# Patient Record
Sex: FEMALE | Race: WHITE | NOT HISPANIC OR LATINO | Employment: FULL TIME | URBAN - NONMETROPOLITAN AREA
[De-identification: names, ages, dates, MRNs, and addresses within clinical notes are randomized per-mention and may not be internally consistent; named-entity substitution may affect disease eponyms.]

---

## 2023-03-20 PROBLEM — M79.7 FIBROMYALGIA: Status: ACTIVE | Noted: 2023-03-20

## 2023-03-20 PROBLEM — F32.1 DEPRESSION, MAJOR, SINGLE EPISODE, MODERATE (MULTI): Status: ACTIVE | Noted: 2023-03-20

## 2023-03-20 PROBLEM — D22.9 ATYPICAL MOLE: Status: ACTIVE | Noted: 2023-03-20

## 2023-03-20 PROBLEM — F98.21 RUMINATION DISORDER: Status: ACTIVE | Noted: 2023-03-20

## 2023-03-20 PROBLEM — G47.33 MILD OBSTRUCTIVE SLEEP APNEA: Status: ACTIVE | Noted: 2023-03-20

## 2023-03-20 PROBLEM — K58.9 IBS (IRRITABLE BOWEL SYNDROME): Status: ACTIVE | Noted: 2023-03-20

## 2023-03-20 PROBLEM — J30.2 SEASONAL ALLERGIES: Status: ACTIVE | Noted: 2023-03-20

## 2023-03-20 PROBLEM — R53.83 FATIGUE: Status: ACTIVE | Noted: 2023-03-20

## 2023-03-20 PROBLEM — R07.89 ATYPICAL CHEST PAIN: Status: ACTIVE | Noted: 2023-03-20

## 2023-03-20 PROBLEM — N94.3 PMS (PREMENSTRUAL SYNDROME): Status: ACTIVE | Noted: 2023-03-20

## 2023-03-20 PROBLEM — F41.9 ANXIETY: Status: ACTIVE | Noted: 2023-03-20

## 2023-03-20 RX ORDER — DROSPIRENONE AND ETHINYL ESTRADIOL 0.02-3(28)
1 KIT ORAL DAILY
COMMUNITY
End: 2023-10-25 | Stop reason: SDUPTHER

## 2023-03-20 RX ORDER — METHOCARBAMOL 500 MG/1
500 TABLET, FILM COATED ORAL DAILY
COMMUNITY
End: 2023-03-27 | Stop reason: ALTCHOICE

## 2023-03-20 RX ORDER — HYDROXYZINE HYDROCHLORIDE 25 MG/1
25 TABLET, FILM COATED ORAL EVERY 6 HOURS PRN
COMMUNITY
Start: 2022-09-26

## 2023-03-20 RX ORDER — MONTELUKAST SODIUM 10 MG/1
1 TABLET ORAL NIGHTLY
COMMUNITY
Start: 2022-05-24 | End: 2024-03-14 | Stop reason: SDUPTHER

## 2023-03-20 RX ORDER — PREGABALIN 200 MG/1
200 CAPSULE ORAL 2 TIMES DAILY
COMMUNITY
End: 2023-09-13 | Stop reason: SDUPTHER

## 2023-03-20 RX ORDER — PROMETHAZINE HYDROCHLORIDE 25 MG/1
25 TABLET ORAL DAILY
COMMUNITY
End: 2023-09-13 | Stop reason: ALTCHOICE

## 2023-03-20 RX ORDER — DULOXETIN HYDROCHLORIDE 60 MG/1
60 CAPSULE, DELAYED RELEASE ORAL DAILY
COMMUNITY
End: 2023-04-10 | Stop reason: SDUPTHER

## 2023-03-21 PROBLEM — R60.0 LEG EDEMA: Status: ACTIVE | Noted: 2023-03-21

## 2023-03-21 PROBLEM — M13.0 POLYARTHROPATHY: Status: ACTIVE | Noted: 2023-03-21

## 2023-03-21 PROBLEM — R60.9 FLUID RETENTION: Status: ACTIVE | Noted: 2023-03-21

## 2023-03-21 PROBLEM — R00.2 PALPITATION: Status: ACTIVE | Noted: 2023-03-21

## 2023-03-21 PROBLEM — Q21.12 PATENT FORAMEN OVALE (HHS-HCC): Status: ACTIVE | Noted: 2023-03-21

## 2023-03-21 RX ORDER — INHALER, ASSIST DEVICES
SPACER (EA) MISCELLANEOUS
COMMUNITY
Start: 2022-10-06 | End: 2023-03-27 | Stop reason: ALTCHOICE

## 2023-03-27 ENCOUNTER — APPOINTMENT (OUTPATIENT)
Dept: PRIMARY CARE | Facility: CLINIC | Age: 23
End: 2023-03-27
Payer: COMMERCIAL

## 2023-03-27 ENCOUNTER — OFFICE VISIT (OUTPATIENT)
Dept: PRIMARY CARE | Facility: CLINIC | Age: 23
End: 2023-03-27
Payer: COMMERCIAL

## 2023-03-27 VITALS
HEIGHT: 63 IN | OXYGEN SATURATION: 98 % | WEIGHT: 229 LBS | SYSTOLIC BLOOD PRESSURE: 130 MMHG | DIASTOLIC BLOOD PRESSURE: 78 MMHG | BODY MASS INDEX: 40.57 KG/M2 | HEART RATE: 114 BPM

## 2023-03-27 DIAGNOSIS — F41.9 ANXIETY: ICD-10-CM

## 2023-03-27 DIAGNOSIS — E66.01 CLASS 3 SEVERE OBESITY DUE TO EXCESS CALORIES WITHOUT SERIOUS COMORBIDITY WITH BODY MASS INDEX (BMI) OF 40.0 TO 44.9 IN ADULT (MULTI): ICD-10-CM

## 2023-03-27 DIAGNOSIS — R00.2 PALPITATION: ICD-10-CM

## 2023-03-27 DIAGNOSIS — K58.2 IRRITABLE BOWEL SYNDROME WITH BOTH CONSTIPATION AND DIARRHEA: ICD-10-CM

## 2023-03-27 DIAGNOSIS — F32.1 DEPRESSION, MAJOR, SINGLE EPISODE, MODERATE (MULTI): ICD-10-CM

## 2023-03-27 DIAGNOSIS — M13.0 POLYARTHROPATHY: Primary | ICD-10-CM

## 2023-03-27 PROBLEM — R60.0 LEG EDEMA: Status: RESOLVED | Noted: 2023-03-21 | Resolved: 2023-03-27

## 2023-03-27 PROBLEM — E66.813 CLASS 3 SEVERE OBESITY DUE TO EXCESS CALORIES WITHOUT SERIOUS COMORBIDITY WITH BODY MASS INDEX (BMI) OF 40.0 TO 44.9 IN ADULT: Status: ACTIVE | Noted: 2023-03-27

## 2023-03-27 PROCEDURE — 3008F BODY MASS INDEX DOCD: CPT | Performed by: INTERNAL MEDICINE

## 2023-03-27 PROCEDURE — 1036F TOBACCO NON-USER: CPT | Performed by: INTERNAL MEDICINE

## 2023-03-27 PROCEDURE — 99214 OFFICE O/P EST MOD 30 MIN: CPT | Performed by: INTERNAL MEDICINE

## 2023-03-27 ASSESSMENT — ENCOUNTER SYMPTOMS
SHORTNESS OF BREATH: 1
WHEEZING: 0
FATIGUE: 1
BACK PAIN: 0
VOMITING: 0
ARTHRALGIAS: 1
CONSTIPATION: 1
NAUSEA: 0
PALPITATIONS: 1
BLOOD IN STOOL: 0
DIARRHEA: 1
COUGH: 0
CHEST TIGHTNESS: 0
ABDOMINAL PAIN: 0

## 2023-03-27 NOTE — ASSESSMENT & PLAN NOTE
-She is going for stress test in the near future and is following closely with cardiology  -There is discussion about the possibility of post-COVID this autonomic regulation

## 2023-03-27 NOTE — PROGRESS NOTES
Subjective   Patient ID: Mable Carrillo is a 22 y.o. female who presents for No chief complaint on file..  HPI  She is here today with multiple concerns.  She is accompanied by her significant other.She is  accompanied by her significant other.  She explains that recently she has been working with the cardiologist and has a stress test planned for the near future.  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  She states she has been encountering lots of symptoms including sometimes a hyper reactivity to things in her environment.  She states she has a friend who suggested she might be suffering from mast cell D regulation.  She complains of swollen joints and so forth.  We did review her laboratory test results from several months ago and she did have a borderline elevated sed rate and elevated CBC platelet count.  I have decided to repeat those tests and we will contact her with the results.  She will let us know how things go after her completion of her cardiac evaluation.  We also discussed other issues such as her anxiety and depression.  She is working closely with psychiatry and recently had medication changes but they were not very helpful.  She will continue to follow-up with them.  She also has her IBS symptoms and I have specifically recommended she start taking the probiotic align for the next month and let us know how things are going.  We did conduct a review of systems.  Review of Systems   Constitutional:  Positive for fatigue.   Respiratory:  Positive for shortness of breath. Negative for cough, chest tightness and wheezing.    Cardiovascular:  Positive for chest pain and palpitations. Negative for leg swelling.   Gastrointestinal:  Positive for constipation and diarrhea. Negative for abdominal pain, blood in stool, nausea and vomiting.   Musculoskeletal:  Positive for arthralgias. Negative for back pain.     Objective   Physical Exam  Vitals and nursing note  reviewed.   Constitutional:       General: She is not in acute distress.     Appearance: Normal appearance.   HENT:      Head: Normocephalic and atraumatic.   Eyes:      Conjunctiva/sclera: Conjunctivae normal.   Cardiovascular:      Rate and Rhythm: Normal rate and regular rhythm.      Heart sounds: Normal heart sounds.   Pulmonary:      Effort: No respiratory distress.      Breath sounds: No wheezing.   Abdominal:      Palpations: Abdomen is soft.      Tenderness: There is no abdominal tenderness. There is no guarding.   Musculoskeletal:         General: No swelling. Normal range of motion.   Skin:     General: Skin is warm and dry.   Neurological:      General: No focal deficit present.      Mental Status: She is alert and oriented to person, place, and time.   Psychiatric:         Behavior: Behavior normal.       Assessment/Plan   Problem List Items Addressed This Visit          Circulatory    Palpitation     -She is going for stress test in the near future and is following closely with cardiology  -There is discussion about the possibility of post-COVID this autonomic regulation            Digestive    IBS (irritable bowel syndrome)     -We discussed trying over-the-counter probiotic called Align            Musculoskeletal    Polyarthropathy - Primary     -She is having complaints of pain and swelling in her joints and hands.  -She has had a prior borderline elevation in her sed rate and also platelet count  -I think it is reasonable to repeat some biomarkers of inflammation and we will be contacting her with results         Relevant Orders    Sedimentation Rate    C-reactive protein    CBC and Auto Differential    ANGELLA    Rheumatoid factor       Endocrine/Metabolic    Class 3 severe obesity due to excess calories without serious comorbidity with body mass index (BMI) of 40.0 to 44.9 in adult (CMS/[facility])     -work on diet and exercise            Other    Anxiety     -She will continue with her counselor and  psychiatrist on medication adjustments         Depression, major, single episode, moderate (CMS/HCC)     -continue with psychiatrist               Angela Staton, DO

## 2023-03-27 NOTE — ASSESSMENT & PLAN NOTE
-She is having complaints of pain and swelling in her joints and hands.  -She has had a prior borderline elevation in her sed rate and also platelet count  -I think it is reasonable to repeat some biomarkers of inflammation and we will be contacting her with results

## 2023-03-27 NOTE — PATIENT INSTRUCTIONS
-As we discussed I am sending you for laboratory tests that I called biomarkers of inflammation.  I believe it is worthwhile to check them 1 more time and if they are not significantly abnormal we will not pursue further blood work in that department.  -I am glad that you are following closely with your psychiatrist and please continue working with them on medication adjustments  -We will need to see what cardiology finds in the way of their work-up and we might need to refer you on if necessary

## 2023-03-29 ENCOUNTER — LAB (OUTPATIENT)
Dept: LAB | Facility: LAB | Age: 23
End: 2023-03-29
Payer: COMMERCIAL

## 2023-03-29 DIAGNOSIS — M06.4 INFLAMMATORY POLYARTHROPATHY (MULTI): Primary | ICD-10-CM

## 2023-03-29 DIAGNOSIS — M13.0 POLYARTHROPATHY: ICD-10-CM

## 2023-03-29 LAB
BASOPHILS (10*3/UL) IN BLOOD BY AUTOMATED COUNT: 0.1 X10E9/L (ref 0–0.1)
BASOPHILS/100 LEUKOCYTES IN BLOOD BY AUTOMATED COUNT: 1.1 % (ref 0–2)
C REACTIVE PROTEIN (MG/L) IN SER/PLAS: 0.98 MG/DL
EOSINOPHILS (10*3/UL) IN BLOOD BY AUTOMATED COUNT: 0.3 X10E9/L (ref 0–0.7)
EOSINOPHILS/100 LEUKOCYTES IN BLOOD BY AUTOMATED COUNT: 3.3 % (ref 0–6)
ERYTHROCYTE DISTRIBUTION WIDTH (RATIO) BY AUTOMATED COUNT: 14 % (ref 11.5–14.5)
ERYTHROCYTE MEAN CORPUSCULAR HEMOGLOBIN CONCENTRATION (G/DL) BY AUTOMATED: 31.4 G/DL (ref 32–36)
ERYTHROCYTE MEAN CORPUSCULAR VOLUME (FL) BY AUTOMATED COUNT: 88 FL (ref 80–100)
ERYTHROCYTES (10*6/UL) IN BLOOD BY AUTOMATED COUNT: 5.35 X10E12/L (ref 4–5.2)
HEMATOCRIT (%) IN BLOOD BY AUTOMATED COUNT: 47.1 % (ref 36–46)
HEMOGLOBIN (G/DL) IN BLOOD: 14.8 G/DL (ref 12–16)
IMMATURE GRANULOCYTES/100 LEUKOCYTES IN BLOOD BY AUTOMATED COUNT: 0.2 % (ref 0–0.9)
LEUKOCYTES (10*3/UL) IN BLOOD BY AUTOMATED COUNT: 9.1 X10E9/L (ref 4.4–11.3)
LYMPHOCYTES (10*3/UL) IN BLOOD BY AUTOMATED COUNT: 3.35 X10E9/L (ref 1.2–4.8)
LYMPHOCYTES/100 LEUKOCYTES IN BLOOD BY AUTOMATED COUNT: 36.7 % (ref 13–44)
MONOCYTES (10*3/UL) IN BLOOD BY AUTOMATED COUNT: 0.64 X10E9/L (ref 0.1–1)
MONOCYTES/100 LEUKOCYTES IN BLOOD BY AUTOMATED COUNT: 7 % (ref 2–10)
NEUTROPHILS (10*3/UL) IN BLOOD BY AUTOMATED COUNT: 4.72 X10E9/L (ref 1.2–7.7)
NEUTROPHILS/100 LEUKOCYTES IN BLOOD BY AUTOMATED COUNT: 51.7 % (ref 40–80)
PLATELETS (10*3/UL) IN BLOOD AUTOMATED COUNT: 516 X10E9/L (ref 150–450)
RHEUMATOID FACTOR (IU/ML) IN SERUM OR PLASMA: <10 IU/ML (ref 0–15)
SEDIMENTATION RATE, ERYTHROCYTE: 78 MM/H (ref 0–20)

## 2023-03-29 PROCEDURE — 85652 RBC SED RATE AUTOMATED: CPT

## 2023-03-29 PROCEDURE — 86431 RHEUMATOID FACTOR QUANT: CPT

## 2023-03-29 PROCEDURE — 36415 COLL VENOUS BLD VENIPUNCTURE: CPT

## 2023-03-29 PROCEDURE — 86140 C-REACTIVE PROTEIN: CPT

## 2023-03-29 PROCEDURE — 86038 ANTINUCLEAR ANTIBODIES: CPT

## 2023-03-29 PROCEDURE — 85025 COMPLETE CBC W/AUTO DIFF WBC: CPT

## 2023-03-30 ENCOUNTER — LAB (OUTPATIENT)
Dept: LAB | Facility: LAB | Age: 23
End: 2023-03-30
Payer: COMMERCIAL

## 2023-03-30 DIAGNOSIS — M13.0 POLYARTHROPATHY: Primary | ICD-10-CM

## 2023-03-30 DIAGNOSIS — M06.4 INFLAMMATORY POLYARTHROPATHY (MULTI): ICD-10-CM

## 2023-03-30 LAB
ALANINE AMINOTRANSFERASE (SGPT) (U/L) IN SER/PLAS: 32 U/L (ref 7–45)
ALBUMIN (G/DL) IN SER/PLAS: 4.5 G/DL (ref 3.4–5)
ALKALINE PHOSPHATASE (U/L) IN SER/PLAS: 75 U/L (ref 33–110)
ANTI-NUCLEAR ANTIBODY (ANA): NEGATIVE
ASPARTATE AMINOTRANSFERASE (SGOT) (U/L) IN SER/PLAS: 27 U/L (ref 9–39)
BILIRUBIN DIRECT (MG/DL) IN SER/PLAS: 0.1 MG/DL (ref 0–0.3)
BILIRUBIN TOTAL (MG/DL) IN SER/PLAS: 0.2 MG/DL (ref 0–1.2)
PROTEIN TOTAL: 8.2 G/DL (ref 6.4–8.2)

## 2023-03-30 PROCEDURE — 36415 COLL VENOUS BLD VENIPUNCTURE: CPT

## 2023-03-30 PROCEDURE — 80076 HEPATIC FUNCTION PANEL: CPT

## 2023-04-10 DIAGNOSIS — F32.1 DEPRESSION, MAJOR, SINGLE EPISODE, MODERATE (MULTI): Primary | ICD-10-CM

## 2023-04-10 RX ORDER — DULOXETIN HYDROCHLORIDE 60 MG/1
60 CAPSULE, DELAYED RELEASE ORAL DAILY
Qty: 30 CAPSULE | Refills: 2 | Status: SHIPPED | OUTPATIENT
Start: 2023-04-10 | End: 2023-09-13 | Stop reason: ALTCHOICE

## 2023-04-24 ENCOUNTER — HOSPITAL ENCOUNTER (OUTPATIENT)
Dept: DATA CONVERSION | Facility: HOSPITAL | Age: 23
End: 2023-04-24
Attending: NURSE PRACTITIONER

## 2023-05-25 ENCOUNTER — OFFICE VISIT (OUTPATIENT)
Dept: PRIMARY CARE | Facility: CLINIC | Age: 23
End: 2023-05-25
Payer: COMMERCIAL

## 2023-05-25 VITALS
BODY MASS INDEX: 40.04 KG/M2 | DIASTOLIC BLOOD PRESSURE: 96 MMHG | HEIGHT: 63 IN | WEIGHT: 226 LBS | HEART RATE: 120 BPM | OXYGEN SATURATION: 98 % | SYSTOLIC BLOOD PRESSURE: 122 MMHG

## 2023-05-25 DIAGNOSIS — U09.9 POST COVID-19 CONDITION, UNSPECIFIED: Primary | ICD-10-CM

## 2023-05-25 DIAGNOSIS — M79.7 FIBROMYALGIA: ICD-10-CM

## 2023-05-25 DIAGNOSIS — R03.0 ELEVATED BLOOD PRESSURE READING: ICD-10-CM

## 2023-05-25 DIAGNOSIS — R60.0 LOCALIZED EDEMA: ICD-10-CM

## 2023-05-25 DIAGNOSIS — F32.1 CURRENT MODERATE EPISODE OF MAJOR DEPRESSIVE DISORDER WITHOUT PRIOR EPISODE (MULTI): ICD-10-CM

## 2023-05-25 DIAGNOSIS — E87.6 HYPOKALEMIA: ICD-10-CM

## 2023-05-25 PROCEDURE — 1036F TOBACCO NON-USER: CPT | Performed by: INTERNAL MEDICINE

## 2023-05-25 PROCEDURE — 99214 OFFICE O/P EST MOD 30 MIN: CPT | Performed by: INTERNAL MEDICINE

## 2023-05-25 RX ORDER — PREGABALIN 200 MG/1
200 CAPSULE ORAL 2 TIMES DAILY
COMMUNITY
Start: 2023-03-31 | End: 2023-05-25 | Stop reason: SDUPTHER

## 2023-05-25 RX ORDER — PREGABALIN 200 MG/1
200 CAPSULE ORAL 2 TIMES DAILY
Qty: 60 CAPSULE | Refills: 2 | Status: SHIPPED | OUTPATIENT
Start: 2023-05-25 | End: 2023-09-13 | Stop reason: ALTCHOICE

## 2023-05-25 RX ORDER — POTASSIUM CHLORIDE 1500 MG/1
20 TABLET, EXTENDED RELEASE ORAL DAILY
Qty: 30 TABLET | Refills: 2 | Status: SHIPPED | OUTPATIENT
Start: 2023-05-25 | End: 2023-06-02

## 2023-05-25 RX ORDER — DULOXETIN HYDROCHLORIDE 20 MG/1
20 CAPSULE, DELAYED RELEASE ORAL DAILY
COMMUNITY
Start: 2023-04-25 | End: 2024-04-09 | Stop reason: WASHOUT

## 2023-05-25 RX ORDER — HYDROCHLOROTHIAZIDE 25 MG/1
25 TABLET ORAL DAILY
Qty: 30 TABLET | Refills: 0 | Status: SHIPPED | OUTPATIENT
Start: 2023-05-25 | End: 2023-07-25 | Stop reason: SDUPTHER

## 2023-05-25 RX ORDER — DULOXETIN HYDROCHLORIDE 60 MG/1
60 CAPSULE, DELAYED RELEASE ORAL DAILY
COMMUNITY
Start: 2023-04-25

## 2023-05-25 ASSESSMENT — ENCOUNTER SYMPTOMS
FATIGUE: 1
FEVER: 0
WHEEZING: 0
ABDOMINAL PAIN: 0
PALPITATIONS: 1
ARTHRALGIAS: 1
VOMITING: 0
CHEST TIGHTNESS: 0
DIARRHEA: 0
DIAPHORESIS: 1
NAUSEA: 0
SHORTNESS OF BREATH: 0
BLOOD IN STOOL: 0
COUGH: 0

## 2023-05-25 NOTE — ASSESSMENT & PLAN NOTE
-For her blood pressure we are continuing with hydrochlorothiazide 25 mg daily and when she returns we will reassess her readings

## 2023-05-25 NOTE — ASSESSMENT & PLAN NOTE
-We have provided a refill on Lyrica 200 mg twice daily  -She has signed a controlled substance contract  -I did check [place] and everything is appropriate

## 2023-05-25 NOTE — PATIENT INSTRUCTIONS
As we discussed I sent a prescription to your pharmacy for Lyrica and please follow the rules with the controlled substance agreement contract  I also sent a prescription to your pharmacy for 2 new medications including hydrochlorothiazide 25 mg daily and potassium chloride 20 mill equivalents daily.  Please take both of these medications each morning and we are hoping that this will help with your swelling as well as your blood pressure.  Please do not hesitate to call if you are having problems with this medication and otherwise I do need to see you back in 4 weeks for reevaluation with lab work

## 2023-05-25 NOTE — ASSESSMENT & PLAN NOTE
-Her potassium level was borderline low in the emergency department  -It is presumed to be secondary to hydrochlorothiazide  -I am adding potassium chloride 20 mill equivalents daily and in approximately 4 weeks she will go for fasting lab work just prior to her follow-up visit with me

## 2023-05-25 NOTE — ASSESSMENT & PLAN NOTE
-She saw a cardiologist at the [facility] for second opinion regarding her abnormal echocardiogram.  She states she had retesting and she does not have a significant PFO  -She states she has been diagnosed with post COVID this autonomic and it was felt that her condition would improve over the next 6 months.  She states there was discussion about starting a beta-blocker and we did discuss it today but decided not to enact this medication since her adding hydrochlorothiazide

## 2023-05-25 NOTE — PROGRESS NOTES
Subjective   Patient ID: Mable Carrillo is a 22 y.o. female who presents for No chief complaint on file..  HPI  She is here today for reevaluation and is accompanied by her boyfriend.  She explains that recently she went to urgent care because of an intensely sore throat and because her blood pressure and pulse rate was elevated she was instructed to go to the emergency department.  She states there she had some lab work and they discovered that her potassium level was borderline low.  She was diagnosed with acute pharyngitis and prescribed a Z-Quinn.  She states that her respiratory symptoms have improved but she did request that I look in her ears nose and throat to make sure all was well.  Her exam looks good today.  She also explains that she went to an urgent care because of the leg swelling and was given a prescription for hydrochlorothiazide 25 mg.  She states she took it for a couple of weeks and it seemed to help a lot with her swelling and her sense of wellbeing.  We talked about how hydrochlorothiazide can cause potassium depletion and could also contribute to dehydration and racing heart.  We also discussed the importance of trying to stay with 1 provider because sometimes her can be issues with overlapping that could be dangerous.  I have agreed to refill her hydrochlorothiazide as I explained this can help with blood pressure.  We do want her to also issue a potassium prescription to take concomitantly with this medicine.  We will also have her get a lab test for potassium in approximately 4 weeks and we will see her back in follow-up just to make sure all is well with her blood pressure and lab work, etc.  We are also providing a refill on her Lyrica for her fibromyalgia and she has signed a controlled substance agreement in the past.  I have checked [place].  Review of Systems   Constitutional:  Positive for diaphoresis and fatigue. Negative for fever.   Respiratory:  Negative for cough, chest  tightness, shortness of breath and wheezing.    Cardiovascular:  Positive for palpitations and leg swelling. Negative for chest pain.   Gastrointestinal:  Negative for abdominal pain, blood in stool, diarrhea, nausea and vomiting.   Musculoskeletal:  Positive for arthralgias.     Objective   Physical Exam  Vitals and nursing note reviewed.   Constitutional:       General: She is not in acute distress.     Appearance: Normal appearance.   HENT:      Head: Normocephalic and atraumatic.   Eyes:      Conjunctiva/sclera: Conjunctivae normal.   Cardiovascular:      Rate and Rhythm: Normal rate and regular rhythm.      Heart sounds: Normal heart sounds.   Pulmonary:      Effort: No respiratory distress.      Breath sounds: No wheezing.   Abdominal:      Palpations: Abdomen is soft.      Tenderness: There is no abdominal tenderness. There is no guarding.   Musculoskeletal:         General: No swelling. Normal range of motion.   Skin:     General: Skin is warm and dry.   Neurological:      General: No focal deficit present.      Mental Status: She is alert and oriented to person, place, and time.   Psychiatric:         Behavior: Behavior normal.       Assessment/Plan   Problem List Items Addressed This Visit          Circulatory    Elevated blood pressure reading     -For her blood pressure we are continuing with hydrochlorothiazide 25 mg daily and when she returns we will reassess her readings            Musculoskeletal    Fibromyalgia     -We have provided a refill on Lyrica 200 mg twice daily  -She has signed a controlled substance contract  -I did check [place] and everything is appropriate         Relevant Medications    pregabalin (Lyrica) 200 mg capsule    Other Relevant Orders    Follow Up In Primary Care       Other    Post covid-19 condition, unspecified - Primary     -She saw a cardiologist at the [facility] for second opinion regarding her abnormal echocardiogram.  She states she had retesting and she does  not have a significant PFO  -She states she has been diagnosed with post COVID this autonomic and it was felt that her condition would improve over the next 6 months.  She states there was discussion about starting a beta-blocker and we did discuss it today but decided not to enact this medication since her adding hydrochlorothiazide         Hypokalemia     -Her potassium level was borderline low in the emergency department  -It is presumed to be secondary to hydrochlorothiazide  -I am adding potassium chloride 20 mill equivalents daily and in approximately 4 weeks she will go for fasting lab work just prior to her follow-up visit with me         Relevant Medications    potassium chloride CR (K-Tab) 20 mEq ER tablet    Other Relevant Orders    Basic Metabolic Panel    Follow Up In Primary Care    Localized edema     She will stay on hydrochlorothiazide 25 mg daily         Relevant Medications    hydroCHLOROthiazide (HYDRODiuril) 25 mg tablet    Other Relevant Orders    Basic Metabolic Panel    Follow Up In Primary Care    Current moderate episode of major depressive disorder without prior episode (CMS/HCC)     -She is on Cymbalta a combination of 60+20 mg daily         Relevant Orders    Follow Up In Primary Care          Angela Staton DO

## 2023-06-02 ENCOUNTER — TELEPHONE (OUTPATIENT)
Dept: PRIMARY CARE | Facility: CLINIC | Age: 23
End: 2023-06-02
Payer: COMMERCIAL

## 2023-06-02 DIAGNOSIS — E87.6 HYPOKALEMIA: Primary | ICD-10-CM

## 2023-06-02 RX ORDER — POTASSIUM CHLORIDE 750 MG/1
10 TABLET, FILM COATED, EXTENDED RELEASE ORAL 2 TIMES DAILY
Qty: 60 TABLET | Refills: 5 | Status: SHIPPED | OUTPATIENT
Start: 2023-06-02 | End: 2023-09-13 | Stop reason: SDUPTHER

## 2023-06-02 NOTE — TELEPHONE ENCOUNTER
Pt called stating that the potassium 20meq is on back order they are recommending you send in 10meq BID [facility]

## 2023-06-26 ENCOUNTER — LAB (OUTPATIENT)
Dept: LAB | Facility: LAB | Age: 23
End: 2023-06-26
Payer: COMMERCIAL

## 2023-06-26 LAB
ANION GAP IN SER/PLAS: 13 MMOL/L (ref 10–20)
CALCIUM (MG/DL) IN SER/PLAS: 9.3 MG/DL (ref 8.6–10.3)
CARBON DIOXIDE, TOTAL (MMOL/L) IN SER/PLAS: 25 MMOL/L (ref 21–32)
CHLORIDE (MMOL/L) IN SER/PLAS: 104 MMOL/L (ref 98–107)
CREATININE (MG/DL) IN SER/PLAS: 0.83 MG/DL (ref 0.5–1.05)
GFR FEMALE: >90 ML/MIN/1.73M2
GLUCOSE (MG/DL) IN SER/PLAS: 150 MG/DL (ref 74–99)
POTASSIUM (MMOL/L) IN SER/PLAS: 4.1 MMOL/L (ref 3.5–5.3)
SODIUM (MMOL/L) IN SER/PLAS: 138 MMOL/L (ref 136–145)
UREA NITROGEN (MG/DL) IN SER/PLAS: 14 MG/DL (ref 6–23)

## 2023-06-26 PROCEDURE — 80048 BASIC METABOLIC PNL TOTAL CA: CPT

## 2023-06-26 PROCEDURE — 36415 COLL VENOUS BLD VENIPUNCTURE: CPT

## 2023-07-07 ENCOUNTER — APPOINTMENT (OUTPATIENT)
Dept: PRIMARY CARE | Facility: CLINIC | Age: 23
End: 2023-07-07
Payer: COMMERCIAL

## 2023-07-19 ENCOUNTER — OFFICE VISIT (OUTPATIENT)
Dept: PRIMARY CARE | Facility: CLINIC | Age: 23
End: 2023-07-19
Payer: COMMERCIAL

## 2023-07-19 VITALS
HEART RATE: 109 BPM | BODY MASS INDEX: 40.57 KG/M2 | DIASTOLIC BLOOD PRESSURE: 80 MMHG | SYSTOLIC BLOOD PRESSURE: 128 MMHG | HEIGHT: 63 IN | OXYGEN SATURATION: 97 % | WEIGHT: 229 LBS

## 2023-07-19 DIAGNOSIS — Q21.12 PATENT FORAMEN OVALE (HHS-HCC): ICD-10-CM

## 2023-07-19 DIAGNOSIS — R59.9 REACTIVE LYMPHADENOPATHY: Primary | ICD-10-CM

## 2023-07-19 PROBLEM — N94.10 DYSPAREUNIA IN FEMALE: Status: ACTIVE | Noted: 2022-06-20

## 2023-07-19 PROBLEM — J30.2 SEASONAL ALLERGIC RHINITIS: Status: ACTIVE | Noted: 2023-07-19

## 2023-07-19 PROCEDURE — 1036F TOBACCO NON-USER: CPT | Performed by: INTERNAL MEDICINE

## 2023-07-19 PROCEDURE — 99213 OFFICE O/P EST LOW 20 MIN: CPT | Performed by: INTERNAL MEDICINE

## 2023-07-19 PROCEDURE — 3008F BODY MASS INDEX DOCD: CPT | Performed by: INTERNAL MEDICINE

## 2023-07-19 RX ORDER — METHYLPHENIDATE HYDROCHLORIDE 18 MG/1
18 TABLET ORAL EVERY MORNING
COMMUNITY
End: 2023-09-13 | Stop reason: ALTCHOICE

## 2023-07-19 RX ORDER — CLINDAMYCIN HYDROCHLORIDE 300 MG/1
300 CAPSULE ORAL 3 TIMES DAILY
COMMUNITY
Start: 2023-07-13 | End: 2023-09-13 | Stop reason: ALTCHOICE

## 2023-07-19 ASSESSMENT — ENCOUNTER SYMPTOMS
VOMITING: 0
SORE THROAT: 1
COUGH: 0
WHEEZING: 0
FEVER: 0
SHORTNESS OF BREATH: 0
DIARRHEA: 0
FATIGUE: 0
PALPITATIONS: 0
NAUSEA: 0
BLOOD IN STOOL: 0

## 2023-07-19 ASSESSMENT — PATIENT HEALTH QUESTIONNAIRE - PHQ9
SUM OF ALL RESPONSES TO PHQ9 QUESTIONS 1 AND 2: 0
2. FEELING DOWN, DEPRESSED OR HOPELESS: NOT AT ALL
1. LITTLE INTEREST OR PLEASURE IN DOING THINGS: NOT AT ALL

## 2023-07-19 NOTE — ASSESSMENT & PLAN NOTE
-I believe that the lymphadenopathy she has been encountering recently is reactive and secondary to her respiratory infection  -The lymph node in question has reduced in size per patient  -I told her to allow couple more weeks for to resolve and if she has concerns beyond that we would be happy to reevaluate

## 2023-07-19 NOTE — ASSESSMENT & PLAN NOTE
"-She had an original \"bubble study \"with her echocardiogram which revealed the possibility of a PFO but subsequently she was seen at the [facility] and had repeat evaluation which did not reveal any evidence of a PFO.  She was diagnosed instead with post-COVID dysautonomia and she has been dealing with that every since.  "

## 2023-07-19 NOTE — PATIENT INSTRUCTIONS
As we discussed I believe that your lymph nodes that we detect today are secondary to your upper respiratory infection  You can continue to monitor and if they do not clear or get smaller in the next couple of weeks please let us know

## 2023-07-19 NOTE — PROGRESS NOTES
Subjective   Patient ID: Mable Carrillo is a 23 y.o. female who presents for No chief complaint on file..  HPI  She is here today for follow-up after a recent visit to urgent care.  She went there on the 13th because she was having some upper respiratory symptoms with ear pain, etc.  She was diagnosed with an upper respiratory infection and prescribed clindamycin.  She even had doxycycline prescription a few weeks before that visit.  She states that ever since she had her strep infection back in April she just does not always quite feel right.  She did have some adenopathy in the neck that she was concerned about but on today's examination I do not palpate any significant adenopathy but just a very small chain of lymph nodes that I would expect to be present during an infection.  She states she does feel like it got smaller.  She fully understands that 1 will experience adenopathy in the face of an infection or even skin infection.  She does have some acne around that area.  She understands that we should allow another week or so for the adenopathy to clear and if it does not she will let me know and we can consider doing imaging.  We did conduct a review of systems.  Review of Systems   Constitutional:  Negative for fatigue and fever.   HENT:  Positive for congestion and sore throat.    Respiratory:  Negative for cough, shortness of breath and wheezing.    Cardiovascular:  Negative for chest pain, palpitations and leg swelling.   Gastrointestinal:  Negative for blood in stool, diarrhea, nausea and vomiting.     Objective   Physical Exam  Vitals and nursing note reviewed.   Constitutional:       General: She is not in acute distress.     Appearance: Normal appearance.   HENT:      Head: Normocephalic and atraumatic.   Eyes:      Conjunctiva/sclera: Conjunctivae normal.   Cardiovascular:      Rate and Rhythm: Normal rate and regular rhythm.      Heart sounds: Normal heart sounds.   Pulmonary:      Effort:  "No respiratory distress.      Breath sounds: No wheezing.   Abdominal:      Palpations: Abdomen is soft.      Tenderness: There is no abdominal tenderness. There is no guarding.   Musculoskeletal:         General: No swelling. Normal range of motion.   Skin:     General: Skin is warm and dry.   Neurological:      General: No focal deficit present.      Mental Status: She is alert and oriented to person, place, and time.   Psychiatric:         Behavior: Behavior normal.       Assessment/Plan   Problem List Items Addressed This Visit       Patent foramen ovale     -She had an original \"bubble study \"with her echocardiogram which revealed the possibility of a PFO but subsequently she was seen at the [facility] and had repeat evaluation which did not reveal any evidence of a PFO.  She was diagnosed instead with post-COVID dysautonomia and she has been dealing with that every since.         Reactive lymphadenopathy - Primary     -I believe that the lymphadenopathy she has been encountering recently is reactive and secondary to her respiratory infection  -The lymph node in question has reduced in size per patient  -I told her to allow couple more weeks for to resolve and if she has concerns beyond that we would be happy to reevaluate               Angela Staton, DO    "

## 2023-07-25 DIAGNOSIS — R60.0 LOCALIZED EDEMA: ICD-10-CM

## 2023-07-25 RX ORDER — HYDROCHLOROTHIAZIDE 25 MG/1
25 TABLET ORAL DAILY
Qty: 30 TABLET | Refills: 0 | Status: SHIPPED | OUTPATIENT
Start: 2023-07-25 | End: 2023-09-13 | Stop reason: SDUPTHER

## 2023-09-13 ENCOUNTER — OFFICE VISIT (OUTPATIENT)
Dept: PRIMARY CARE | Facility: CLINIC | Age: 23
End: 2023-09-13
Payer: COMMERCIAL

## 2023-09-13 VITALS
SYSTOLIC BLOOD PRESSURE: 127 MMHG | BODY MASS INDEX: 40.06 KG/M2 | DIASTOLIC BLOOD PRESSURE: 88 MMHG | HEIGHT: 63 IN | OXYGEN SATURATION: 94 % | WEIGHT: 226.1 LBS | TEMPERATURE: 97.8 F | HEART RATE: 116 BPM

## 2023-09-13 DIAGNOSIS — F32.1 CURRENT MODERATE EPISODE OF MAJOR DEPRESSIVE DISORDER WITHOUT PRIOR EPISODE (MULTI): ICD-10-CM

## 2023-09-13 DIAGNOSIS — R59.1 LYMPHADENOPATHY: ICD-10-CM

## 2023-09-13 DIAGNOSIS — L70.0 ACNE VULGARIS: ICD-10-CM

## 2023-09-13 DIAGNOSIS — M79.7 FIBROMYALGIA: ICD-10-CM

## 2023-09-13 DIAGNOSIS — R59.9 REACTIVE LYMPHADENOPATHY: ICD-10-CM

## 2023-09-13 DIAGNOSIS — E87.6 HYPOKALEMIA: ICD-10-CM

## 2023-09-13 DIAGNOSIS — N94.10 DYSPAREUNIA IN FEMALE: ICD-10-CM

## 2023-09-13 DIAGNOSIS — N94.3 PMS (PREMENSTRUAL SYNDROME): ICD-10-CM

## 2023-09-13 DIAGNOSIS — F41.9 ANXIETY: ICD-10-CM

## 2023-09-13 DIAGNOSIS — J30.2 SEASONAL ALLERGIES: ICD-10-CM

## 2023-09-13 DIAGNOSIS — R70.0 ELEVATED ERYTHROCYTE SEDIMENTATION RATE: Primary | ICD-10-CM

## 2023-09-13 DIAGNOSIS — R60.0 LOCALIZED EDEMA: ICD-10-CM

## 2023-09-13 PROBLEM — Q21.12 PATENT FORAMEN OVALE (HHS-HCC): Status: RESOLVED | Noted: 2023-03-21 | Resolved: 2023-09-13

## 2023-09-13 PROCEDURE — 99205 OFFICE O/P NEW HI 60 MIN: CPT | Performed by: PHYSICIAN ASSISTANT

## 2023-09-13 PROCEDURE — 1036F TOBACCO NON-USER: CPT | Performed by: PHYSICIAN ASSISTANT

## 2023-09-13 PROCEDURE — 3008F BODY MASS INDEX DOCD: CPT | Performed by: PHYSICIAN ASSISTANT

## 2023-09-13 RX ORDER — POTASSIUM CHLORIDE 750 MG/1
10 TABLET, FILM COATED, EXTENDED RELEASE ORAL 2 TIMES DAILY
Qty: 60 TABLET | Refills: 5 | Status: SHIPPED | OUTPATIENT
Start: 2023-09-13 | End: 2023-11-28 | Stop reason: ALTCHOICE

## 2023-09-13 RX ORDER — HYDROCHLOROTHIAZIDE 25 MG/1
25 TABLET ORAL DAILY
Qty: 30 TABLET | Refills: 11 | Status: SHIPPED | OUTPATIENT
Start: 2023-09-13 | End: 2023-11-28 | Stop reason: ALTCHOICE

## 2023-09-13 RX ORDER — METHYLPHENIDATE HYDROCHLORIDE 27 MG/1
27 TABLET ORAL EVERY MORNING
COMMUNITY
Start: 2023-09-09

## 2023-09-13 RX ORDER — TRETINOIN 0.1 MG/G
GEL TOPICAL NIGHTLY
Qty: 45 G | Refills: 2 | Status: SHIPPED | OUTPATIENT
Start: 2023-09-13 | End: 2024-01-11

## 2023-09-13 RX ORDER — PREGABALIN 200 MG/1
200 CAPSULE ORAL 2 TIMES DAILY
Qty: 180 CAPSULE | Refills: 3 | Status: SHIPPED | OUTPATIENT
Start: 2023-09-13 | End: 2024-03-14 | Stop reason: SDUPTHER

## 2023-09-13 RX ORDER — LEVOCETIRIZINE DIHYDROCHLORIDE 5 MG/1
10 TABLET, FILM COATED ORAL EVERY EVENING
COMMUNITY

## 2023-09-13 RX ORDER — DOXYCYCLINE 100 MG/1
100 CAPSULE ORAL 2 TIMES DAILY
Qty: 14 CAPSULE | Refills: 0 | Status: SHIPPED | OUTPATIENT
Start: 2023-09-13 | End: 2023-09-20

## 2023-09-13 ASSESSMENT — PATIENT HEALTH QUESTIONNAIRE - PHQ9
SUM OF ALL RESPONSES TO PHQ9 QUESTIONS 1 AND 2: 2
1. LITTLE INTEREST OR PLEASURE IN DOING THINGS: SEVERAL DAYS
2. FEELING DOWN, DEPRESSED OR HOPELESS: SEVERAL DAYS

## 2023-09-13 NOTE — PROGRESS NOTES
Subjective   Patient ID: Mable Carrillo is a 23 y.o. female who presents for \[facility] [place] [facility]\"" Care (Patient transferring from Dr. Staton and last seen x 1 month ago./Patient having lymph node swelling several months and now having lymph node swelling with discomfort on the back of her left neck area.  Patient states has been treated with antibiotics with no success./PAP done 12/2021 and will follows with [place] [facility]./Colonoscopy done 04/2021.).  HPI    Patient presents to [facility].    Patient has fairly complicated medical history that includes anxiety, depression, ADHD, intermittent edema, dyspareunia, hypokalemia, PMDD, post COVID dysautonomia, fibromyalgia: Mild sleep apnea: And chronic lymphadenopathy.  Patient currently takes oral contraceptives, Cymbalta, hydrochlorothiazide, Atarax, Xyzal, Concerta, Singulair, potassium, and Lyrica in management of these.  Patient does see psychiatry here in [place].    Patient's primary concern today is left inferior occipital possible lymph node.  Patient reports pain but steady in size subcutaneous lump on the left lymph node chain.  Patient has a history of chronic lymphadenopathy in various areas including the axilla, groin, and neck.  Patient has been worked up for this before with no diagnoses made.  Patient had ESR drawn as recently as March of this year.  Most recent drawl was 78, prior was 22.  Follow-up autoimmune labs were unremarkable.  The patient is concerned about possible malignant cause.  Patient's other lab work obtained in the spring of this year were unremarkable.    Patient does have a longstanding history of acne vulgaris.  No prior attempted conservative management reported.  Some of the more recent and severe outbreaks are on the occipital scalp line and near the area of concern/suspected lymph node.      Review of Systems    Constitutional:  See HPI   Eye:  No recent visual problem.    Respiratory:  No shortness of breath, No cough.   "  Cardiovascular:  No chest pain.    Gastrointestinal:  No abdominal pain, No nausea, No vomiting.    Genitourinary:  No dysuria, No hematuria.    Musculoskeletal:  No decreased range of motion.    Integumentary: See HPI  Neurologic:  Alert and oriented X4, No numbness, No tingling.    All other systems are negative     Objective     /88   Pulse (!) 116   Temp 36.6 °C (97.8 °F) (Temporal)   Ht 1.6 m (5' 3\")   Wt 103 kg (226 lb 1.6 oz)   SpO2 94%   BMI 40.05 kg/m²     Physical Exam  General:  Alert and oriented, No acute distress.    Eye:  Pupils are equal, round and reactive to light, Extraocular movements are intact, Normal conjunctiva.    HENT:  Normocephalic, Normal hearing, Oral mucosa is moist, No pharyngeal erythema, No sinus tenderness.    Neck:  Supple, Non-tender, No lymphadenopathy.    Respiratory:  Lungs are clear to auscultation, Respirations are non-labored, Breath sounds are equal    Cardiovascular:  Normal rate, Regular rhythm.    Gastrointestinal:  Non-distended.    Musculoskeletal: Normal range of motion, Normal strength, No tenderness, No swelling, No deformity, Normal gait.     Integumentary: Face and neck with diffuse acne in various stages of healing; posterior occipital scalp on the left with a subcutaneous, 1.5 cm, slightly firm, mobile, slightly tender area  Neurologic:  Alert, Oriented, Normal sensory, Normal motor function, No focal deficits, Cranial Nerves II-XII are grossly intact   Psychiatric:  Cooperative, Appropriate mood & affect.    Assessment/Plan   Anxiety and depression/ADHD: Continue with psychiatry; Concerta, Atarax, and Cymbalta    Intermittent edema: Continue hydrochlorothiazide with potassium supplementation    Seasonal allergies: Continue Xyzal and Singulair:    Dyspareunia: Patient requesting physical therapy for pelvic floor.  This was ordered.    Lymphadenopathy: CT head and neck ordered.  Further recommendations pending results.    Elevated ESR: Follow-up " ESR ordered blood basic screening labs otherwise.    Acne vulgaris: Start Retin-A    Follow-up in 6 months or as needed unless work-up dictates otherwise.  Problem List Items Addressed This Visit       Anxiety    PMS (premenstrual syndrome)    Seasonal allergies    Fibromyalgia    Relevant Medications    pregabalin (Lyrica) 200 mg capsule    Hypokalemia    Relevant Medications    potassium chloride CR (Klor-Con) 10 mEq ER tablet    Localized edema    Relevant Medications    hydroCHLOROthiazide (HYDRODiuril) 25 mg tablet    Current moderate episode of major depressive disorder without prior episode (CMS/HCC)    Reactive lymphadenopathy    Dyspareunia in female    Relevant Orders    Referral to Physical Therapy     Other Visit Diagnoses       Elevated erythrocyte sedimentation rate    -  Primary    Relevant Orders    CBC    Comprehensive Metabolic Panel    TSH with reflex to Free T4 if abnormal    Sedimentation Rate    Lymphadenopathy        Relevant Medications    doxycycline (Vibramycin) 100 mg capsule    Other Relevant Orders    CBC    Comprehensive Metabolic Panel    TSH with reflex to Free T4 if abnormal    Sedimentation Rate    CT head w IV contrast    CT soft tissue neck w IV contrast    Acne vulgaris        Relevant Medications    tretinoin (Retin-A) 0.01 % gel    doxycycline (Vibramycin) 100 mg capsule            Final diagnoses:   [R70.0] Elevated erythrocyte sedimentation rate   [R59.1] Lymphadenopathy   [E87.6] Hypokalemia   [M79.7] Fibromyalgia   [N94.10] Dyspareunia in female   [N94.3] PMS (premenstrual syndrome)   [R59.9] Reactive lymphadenopathy   [F41.9] Anxiety   [F32.1] Current moderate episode of major depressive disorder without prior episode (CMS/HCC)   [R60.0] Localized edema   [L70.0] Acne vulgaris   [J30.2] Seasonal allergies

## 2023-09-14 ENCOUNTER — LAB (OUTPATIENT)
Dept: LAB | Facility: LAB | Age: 23
End: 2023-09-14
Payer: COMMERCIAL

## 2023-09-14 DIAGNOSIS — R70.0 ELEVATED ERYTHROCYTE SEDIMENTATION RATE: ICD-10-CM

## 2023-09-14 DIAGNOSIS — R59.1 LYMPHADENOPATHY: ICD-10-CM

## 2023-09-14 LAB
ALANINE AMINOTRANSFERASE (SGPT) (U/L) IN SER/PLAS: 21 U/L (ref 7–45)
ALBUMIN (G/DL) IN SER/PLAS: 4.4 G/DL (ref 3.4–5)
ALKALINE PHOSPHATASE (U/L) IN SER/PLAS: 75 U/L (ref 33–110)
ANION GAP IN SER/PLAS: 11 MMOL/L (ref 10–20)
ASPARTATE AMINOTRANSFERASE (SGOT) (U/L) IN SER/PLAS: 20 U/L (ref 9–39)
BILIRUBIN TOTAL (MG/DL) IN SER/PLAS: 0.4 MG/DL (ref 0–1.2)
CALCIUM (MG/DL) IN SER/PLAS: 9.7 MG/DL (ref 8.6–10.3)
CARBON DIOXIDE, TOTAL (MMOL/L) IN SER/PLAS: 26 MMOL/L (ref 21–32)
CHLORIDE (MMOL/L) IN SER/PLAS: 104 MMOL/L (ref 98–107)
CREATININE (MG/DL) IN SER/PLAS: 0.78 MG/DL (ref 0.5–1.05)
ERYTHROCYTE DISTRIBUTION WIDTH (RATIO) BY AUTOMATED COUNT: 13.3 % (ref 11.5–14.5)
ERYTHROCYTE MEAN CORPUSCULAR HEMOGLOBIN CONCENTRATION (G/DL) BY AUTOMATED: 32.2 G/DL (ref 32–36)
ERYTHROCYTE MEAN CORPUSCULAR VOLUME (FL) BY AUTOMATED COUNT: 85 FL (ref 80–100)
ERYTHROCYTES (10*6/UL) IN BLOOD BY AUTOMATED COUNT: 5.11 X10E12/L (ref 4–5.2)
GFR FEMALE: >90 ML/MIN/1.73M2
GLUCOSE (MG/DL) IN SER/PLAS: 94 MG/DL (ref 74–99)
HEMATOCRIT (%) IN BLOOD BY AUTOMATED COUNT: 43.5 % (ref 36–46)
HEMOGLOBIN (G/DL) IN BLOOD: 14 G/DL (ref 12–16)
LEUKOCYTES (10*3/UL) IN BLOOD BY AUTOMATED COUNT: 9.9 X10E9/L (ref 4.4–11.3)
PLATELETS (10*3/UL) IN BLOOD AUTOMATED COUNT: 437 X10E9/L (ref 150–450)
POTASSIUM (MMOL/L) IN SER/PLAS: 3.9 MMOL/L (ref 3.5–5.3)
PROTEIN TOTAL: 7.5 G/DL (ref 6.4–8.2)
SEDIMENTATION RATE, ERYTHROCYTE: 63 MM/H (ref 0–20)
SODIUM (MMOL/L) IN SER/PLAS: 137 MMOL/L (ref 136–145)
THYROTROPIN (MIU/L) IN SER/PLAS BY DETECTION LIMIT <= 0.05 MIU/L: 1.37 MIU/L (ref 0.44–3.98)
UREA NITROGEN (MG/DL) IN SER/PLAS: 12 MG/DL (ref 6–23)

## 2023-09-14 PROCEDURE — 85652 RBC SED RATE AUTOMATED: CPT

## 2023-09-14 PROCEDURE — 80053 COMPREHEN METABOLIC PANEL: CPT

## 2023-09-14 PROCEDURE — 85027 COMPLETE CBC AUTOMATED: CPT

## 2023-09-14 PROCEDURE — 36415 COLL VENOUS BLD VENIPUNCTURE: CPT

## 2023-09-14 PROCEDURE — 84443 ASSAY THYROID STIM HORMONE: CPT

## 2023-09-19 ENCOUNTER — LAB (OUTPATIENT)
Dept: LAB | Facility: LAB | Age: 23
End: 2023-09-19
Payer: COMMERCIAL

## 2023-09-19 DIAGNOSIS — R73.9 HYPERGLYCEMIA: ICD-10-CM

## 2023-09-19 DIAGNOSIS — R70.0 ELEVATED ERYTHROCYTE SEDIMENTATION RATE: Primary | ICD-10-CM

## 2023-09-19 DIAGNOSIS — R70.0 ELEVATED ERYTHROCYTE SEDIMENTATION RATE: ICD-10-CM

## 2023-09-19 LAB
ESTIMATED AVERAGE GLUCOSE FOR HBA1C: 117 MG/DL
HEMOGLOBIN A1C/HEMOGLOBIN TOTAL IN BLOOD: 5.7 %

## 2023-09-19 PROCEDURE — 86376 MICROSOMAL ANTIBODY EACH: CPT

## 2023-09-19 PROCEDURE — 83519 RIA NONANTIBODY: CPT

## 2023-09-19 PROCEDURE — 86225 DNA ANTIBODY NATIVE: CPT

## 2023-09-19 PROCEDURE — 83036 HEMOGLOBIN GLYCOSYLATED A1C: CPT

## 2023-09-19 PROCEDURE — 36415 COLL VENOUS BLD VENIPUNCTURE: CPT

## 2023-09-19 PROCEDURE — 86800 THYROGLOBULIN ANTIBODY: CPT

## 2023-09-19 PROCEDURE — 84445 ASSAY OF TSI GLOBULIN: CPT

## 2023-09-20 LAB
ANTI-DNA (DS): <1 IU/ML
THYROPEROXIDASE AB (IU/ML) IN SER/PLAS: 52 IU/ML

## 2023-09-22 LAB — TSH RECEPTOR ANTIBODY: <0.8 IU/L

## 2023-09-23 LAB — THYROGLOBULIN AB (IU/ML) IN SER/PLAS: <0.9 IU/ML (ref 0–4)

## 2023-10-02 ENCOUNTER — APPOINTMENT (OUTPATIENT)
Dept: PHYSICAL THERAPY | Facility: CLINIC | Age: 23
End: 2023-10-02
Payer: COMMERCIAL

## 2023-10-03 LAB — THYROID STIMULATING IMMUNOGLOBULIN: <1 TSI INDEX

## 2023-10-25 ENCOUNTER — OFFICE VISIT (OUTPATIENT)
Dept: PRIMARY CARE | Facility: CLINIC | Age: 23
End: 2023-10-25
Payer: COMMERCIAL

## 2023-10-25 ENCOUNTER — TREATMENT (OUTPATIENT)
Dept: PHYSICAL THERAPY | Facility: CLINIC | Age: 23
End: 2023-10-25
Payer: COMMERCIAL

## 2023-10-25 VITALS
DIASTOLIC BLOOD PRESSURE: 77 MMHG | WEIGHT: 235.1 LBS | HEIGHT: 63 IN | HEART RATE: 114 BPM | SYSTOLIC BLOOD PRESSURE: 120 MMHG | BODY MASS INDEX: 41.66 KG/M2

## 2023-10-25 DIAGNOSIS — N94.10 DYSPAREUNIA IN FEMALE: ICD-10-CM

## 2023-10-25 DIAGNOSIS — Z30.011 ORAL CONTRACEPTION INITIAL PRESCRIPTION: ICD-10-CM

## 2023-10-25 DIAGNOSIS — N94.10 UNSPECIFIED DYSPAREUNIA: ICD-10-CM

## 2023-10-25 DIAGNOSIS — R10.2 PELVIC PAIN: Primary | ICD-10-CM

## 2023-10-25 DIAGNOSIS — S39.012A ACUTE MYOFASCIAL STRAIN OF LUMBAR REGION, INITIAL ENCOUNTER: Primary | ICD-10-CM

## 2023-10-25 PROCEDURE — 97161 PT EVAL LOW COMPLEX 20 MIN: CPT | Mod: GP | Performed by: PHYSICAL THERAPIST

## 2023-10-25 PROCEDURE — 1036F TOBACCO NON-USER: CPT | Performed by: PHYSICIAN ASSISTANT

## 2023-10-25 PROCEDURE — 3008F BODY MASS INDEX DOCD: CPT | Performed by: PHYSICIAN ASSISTANT

## 2023-10-25 PROCEDURE — 99214 OFFICE O/P EST MOD 30 MIN: CPT | Performed by: PHYSICIAN ASSISTANT

## 2023-10-25 PROCEDURE — 97110 THERAPEUTIC EXERCISES: CPT | Mod: GP | Performed by: PHYSICAL THERAPIST

## 2023-10-25 RX ORDER — DROSPIRENONE AND ETHINYL ESTRADIOL 0.02-3(28)
1 KIT ORAL DAILY
Qty: 28 TABLET | Refills: 12 | Status: SHIPPED | OUTPATIENT
Start: 2023-10-25

## 2023-10-25 RX ORDER — PREDNISONE 10 MG/1
TABLET ORAL
Qty: 12 TABLET | Refills: 0 | Status: SHIPPED | OUTPATIENT
Start: 2023-10-25 | End: 2024-02-29 | Stop reason: WASHOUT

## 2023-10-25 RX ORDER — CYCLOBENZAPRINE HCL 10 MG
10 TABLET ORAL 3 TIMES DAILY PRN
Qty: 30 TABLET | Refills: 1 | Status: SHIPPED | OUTPATIENT
Start: 2023-10-25

## 2023-10-25 ASSESSMENT — PATIENT HEALTH QUESTIONNAIRE - PHQ9
1. LITTLE INTEREST OR PLEASURE IN DOING THINGS: SEVERAL DAYS
2. FEELING DOWN, DEPRESSED OR HOPELESS: SEVERAL DAYS
SUM OF ALL RESPONSES TO PHQ9 QUESTIONS 1 AND 2: 2

## 2023-10-25 ASSESSMENT — PAIN - FUNCTIONAL ASSESSMENT: PAIN_FUNCTIONAL_ASSESSMENT: 0-10

## 2023-10-25 ASSESSMENT — PAIN SCALES - GENERAL: PAINLEVEL_OUTOF10: 5 - MODERATE PAIN

## 2023-10-25 NOTE — PROGRESS NOTES
"Subjective   Patient ID: Jaqueline Carrillo is a 23 y.o. female who presents for Back Pain (Patient having left lower back discomfort that radiates down the left leg x 3 days.).  HPI    Patient presents for evaluation of suspected low back injury.  Patient reports having a day of increased activity several days ago and shortly thereafter there was left-sided low back pain that radiates down the leg.  Patient has history of similar presentation that eventually required an ablation per pain management.  No trauma or fall.  No reported attempted conservative management.  Pain exacerbated by use.  No other complaints.    Review of Systems    Constitutional:  See HPI    Musculoskeletal: See HPI  Integumentary:  No rash.   Neurologic:  Alert and oriented X4, No numbness, No tingling.    All other systems are negative     Objective     /77   Pulse (!) 114   Ht 1.6 m (5' 3\")   Wt 107 kg (235 lb 1.6 oz)   BMI 41.65 kg/m²     Physical Exam    General:  Alert and oriented, No acute distress.    Eye:  Pupils are equal, round and reactive to light, Normal conjunctiva.    HENT:  Normocephalic,   Neck:  Supple    Respiratory: Respirations are non-labored   Musculoskeletal: Pain with extension of the low back; no midline tenderness or step-offs  Neurologic:  Alert, Oriented, Normal sensory, Cranial Nerves II-XII are grossly intact  Psychiatric:  Cooperative, Appropriate mood & affect.    Assessment/Plan   Low back strain; Flexeril and prednisone.  Rest and ice otherwise.    Oral contraceptives: Patient requesting refill    Follow-up as needed or as scheduled  Problem List Items Addressed This Visit    None  Visit Diagnoses       Acute myofascial strain of lumbar region, initial encounter    -  Primary    Relevant Medications    predniSONE (Deltasone) 10 mg tablet    cyclobenzaprine (Flexeril) 10 mg tablet    Oral contraception initial prescription        Relevant Medications    drospirenone-ethinyl estradioL (Rissa, Gianvi) " 3-0.02 mg tablet            Final diagnoses:   [S39.012A] Acute myofascial strain of lumbar region, initial encounter   [Z30.011] Oral contraception initial prescription

## 2023-10-25 NOTE — PROGRESS NOTES
Physical Therapy    Physical Therapy Evaluation and Treatment      Patient Name: Jaqueline Carrillo  MRN: [id number]  Today's Date: 10/25/2023  Time Calculation  Start Time: 1324  Stop Time: 1415  Time Calculation (min): 51 min      Assessment: patient presents with a functional leg length discrepancy with R long/L short leg which was corrected with manual intervention.  This is contributing to her low back/pelvic and hip pain.  Patient also presents with weakness and incoordination of core muscles (specifically her pelvic floor and Transverse aBdominus muscles).  Patient is experiencing some stress incontinence due to weakness and poor control of pelvic floor muscles.  Patient also presents with muscle imbalance in hip/pelvis area due to pelvic malalignment and compensation.  She wouuld benefit from pelvic re-alignment and stabilization to maintain pelvic alignment, manual therapy to address soft tissue imbalance, stretches and core strengthening.  Once pelvic alignment is consistent, she has improved muscle length of hip/pelvic muscles and improved core strength, if she is still experiencing pelvic floor tighness issues then patient may benefit from an internal pelvic floor evaluation and treatment at that time (not available at this facility).   She is a good rehab candidate as long as she continues to be compliant with her HEP to build strength and endurance of core muscles in order to reduce and eliminate urinary incontinence and pelvic pain.        Plan:  Treatment/Interventions: Biofeedback, Education/ Instruction, Manual therapy, Neuromuscular re-education, Self care/ home management, Therapeutic exercises  PT Plan: Skilled PT  PT Frequency: 2 times per week  Duration: 4 weeks  Onset Date: 04/01/23  Rehab Potential: Good    Current Problem:   1. Pelvic pain  Follow Up In Physical Therapy      2. Unspecified dyspareunia  Referral to Physical Therapy    Follow Up In Physical Therapy      3. Dyspareunia in female  "           Subjective    General:  General  Reason for Referral: N94.10 dyspareunia  Referred By: Justin Otero  Past Medical History Relevant to Rehab: fibromyalgia, IBS, PMDD, mDD, CK, long covid  General Comment: patient reports she has had internal pelvic floor therapy in past (last was beginning of 2022 in [place]); states she had been doing exercises but feels like she has regressed.  states she has difficulty inserting tampon.  states she still does her dilators but has had difficulty with them;  states her fibro is flared up;  states she feels tightness around hips/pelvis;  states she is doing more dilator therapy and breathing to relax and not kegels.  states she has cups she will use for side of her hips;  states she had a lot of release and internal stretches; states she has done some stretches for \"hips and glutes\"; states it feels like tightness travels deep into vagina.  Precautions:  Precautions  Precautions Comment: no fall risk    Pain:  Pain Assessment  Pain Assessment: 0-10  Pain Score: 5 - Moderate pain  Pain Type: Acute pain    Prior Level of Function:  Prior Function Per Pt/Caregiver Report  Level of [place]: Independent with ADLs and functional transfers, Independent with homemaking with ambulation    Objective   standing alignment: iliac crest height right higher compared to left  supine leg length: right leg long/left short  long sit: equal leg length    hip MMTs WFL    muscle length:  Hamstrings hip flexor piriformis rotators B LE deficits  palpation: tightness in QL, hip flexor, ITBand  Levator Ani: able to isolate and contract with instruction, cues and imagery    Transverse ABdominis: able to isolate and contract with instruction, cues and imagery    Treatments:    Therapeutic Exercise  Therapeutic Exercise Performed: Yes  MET to correct pelvis L anterior/R post  innominate rotation (resist ext on L and flex on R) 5\" x5 f/b  shotgun f/b kegels:  - basic kegels (1 second " "hold/relax) with instruction, imagery and palpating levator Ani at gluteal cleft x10   and instructed for HEP 10 reps 3x/day  - elevator kegels: 2-levels, with instruction, cuing, and imagery;  palpating Levator Ani at gluteal cleft in L S/L; instructed for HEP 10 reps 1x/day  -long hold kegels : 3\" holds x10, instructed 3\" holds 10 reps 1x/day (increase able to 10\")  -quick flick educated on  -education: PF phuong prior to stress, PF inhibition techniques with urge  -deep abdominal breathing completed/instructed for HEP  -TrA phuong plantigrade with T/V cues x10 and instructed for HEP 10 reps 2x/day  -piriformis stretch supine 30 \" completed and instructed for HEP  -QL stretch doorway  30 \" completed and instructed for HEP  -hip flexor stretch on chair A  -adductor stretch (side lunge)  30 \" completed and instructed for HEP  -hamstrings A once pelvis is stabilized/MET not needed  Body mechanics and back safety education    OP EDUCATION:  Education  Individual(s) Educated: Patient  Education Provided: Home Exercise Program  Diagnosis and Precautions: pelvic pain, dysparenuria  Risk and Benefits Discussed with Patient/Caregiver/Other: yes  Patient/Caregiver Demonstrated Understanding: yes  Plan of Care Discussed and Agreed Upon: yes  Patient Response to Education: Patient/Caregiver Verbalized Understanding of Information, Patient/Caregiver Performed Return Demonstration of Exercises/Activities, Patient/Caregiver Asked Appropriate Questions  Education Comment: kegels, pelvic alignment, Tr A isometric, piriformis, adductor, QL stretches    Goals:  Active       PT Problem       PT Goal PT       Start:  10/25/23    Expected End:  12/22/23       -Patient will have improved strength and endurance of pelvic floor to complete 50 reps of kegels /day without compensation...2 weeks  -Patient will have improved coordination and strength of pelvic floor to engage pelvic floor prior to coughing/sneezing to reduce/eliminate stress " incontinence...4 weeks  -Patient will have improved strength and endurance of core muscles to complete 100 reps of kegels per day for 1 week...4 weeks  -Patient will have no episodes of stress incontinence x1 week...6 weeks  -Patient will demonstrate good ability to isolate and contract Transverse ABdominus muscle to assist with pelvic stabilization during lifting, carrying, exercises to help reduce urinary leakage and to support back...4weeks  -patient will have good strength/stability of core musculature to stabilize pelvis in alignment and eliminate functional leg length discrepancy...6 weeks  -patient will report improved ease with dilator use for pelvic floor stretching.. 4weeks  -patient will report 2/10 or less R hip/pelvic pain.. 6 weeks  -patient will report independence with HEP

## 2023-10-31 DIAGNOSIS — L70.0 ACNE VULGARIS: Primary | ICD-10-CM

## 2023-10-31 RX ORDER — DOXYCYCLINE 100 MG/1
100 TABLET ORAL 2 TIMES DAILY
Qty: 20 TABLET | Refills: 0 | Status: SHIPPED | OUTPATIENT
Start: 2023-10-31 | End: 2023-11-10

## 2023-11-01 ENCOUNTER — TREATMENT (OUTPATIENT)
Dept: PHYSICAL THERAPY | Facility: CLINIC | Age: 23
End: 2023-11-01
Payer: COMMERCIAL

## 2023-11-01 DIAGNOSIS — N94.10 UNSPECIFIED DYSPAREUNIA: ICD-10-CM

## 2023-11-01 DIAGNOSIS — R10.2 PELVIC PAIN: ICD-10-CM

## 2023-11-01 PROCEDURE — 97140 MANUAL THERAPY 1/> REGIONS: CPT | Mod: GP,CQ

## 2023-11-01 PROCEDURE — 97110 THERAPEUTIC EXERCISES: CPT | Mod: GP,CQ

## 2023-11-01 ASSESSMENT — PAIN - FUNCTIONAL ASSESSMENT: PAIN_FUNCTIONAL_ASSESSMENT: 0-10

## 2023-11-01 ASSESSMENT — PAIN SCALES - GENERAL: PAINLEVEL_OUTOF10: 4

## 2023-11-01 NOTE — PROGRESS NOTES
"Physical Therapy Treatment    Patient Name: Mable Carrillo  MRN: [id number]  Today's Date: 11/1/2023  Time Calculation  Start Time: 0838  Stop Time: 0913  Time Calculation (min): 35 min      Assessment:Patient identified by name and birth date. IASTM/STM  and cupping completed to reduce pain and soft tissue restriction in  L side LB/hip musculature.        Plan: Continue with manual therapy to reduce soft tissue restriction and pain and strengthening to allow   improved core/pelvic floor stability to allow decreased frequency of stress incontinence. -CG     Treatment/Interventions: Biofeedback, Education/ Instruction, Manual therapy, Neuromuscular re-education, Self care/ home management, Therapeutic exercises  PT Plan: Skilled PT  PT Frequency: 2 times per week  Duration: 4 weeks  Onset Date: 04/01/23  Rehab Potential: Good  Current Problem  Problem List Items Addressed This Visit             ICD-10-CM       Gastrointestinal and Abdominal    Pelvic pain R10.2     Other Visit Diagnoses         Codes    Unspecified dyspareunia     N94.10            Subjective She feels more equal leg length after last session and decreased pain. Notes tightness on L side widespread. States in am very stiff and by evening feels fatigued. No change in frequency of stress incontinence.   General     Precautions  Precautions  Precautions Comment: no fall risk  Pain  Pain Assessment: 0-10  Pain Score: 4  Pain Type: Acute pain  Pain Location: Back  Pain Orientation: Left, Lower      Treatments:        MET : Not Needed   - basic kegels (1 second hold/relax)   - elevator kegels: 2-levels,   -long hold kegels : 3\" holds x10, instructed 3\" holds 10 reps   -quick flick educated on   -deep abdominal breathing completed/instructed for HEP  -TrA phuong plantigrade   -piriformis stretch supine 30 \" completed and instructed for HEP  -QL stretch doorway  30 \" completed and instructed for HEP  -hip flexor stretch on chair   -adductor stretch " "(side lunge)  30 \"  -hamstrings A once pelvis is stabilized/MET not needed  Plantigrade Hip Extension 10x each (N)   Standing Hip Abd with TrA 10x each (N)  Rows with TrA purple cords 10x (N)  Pull Downs purple cords 10x (N)      MANUAL  IASTM/STM: to L: QL glutes paraspinals                                      HG9 brush J hook frame Bevel Up 0-30*    Cupping to L: QL glutes   OP EDUCATION:       Goals:  Active       PT Problem       PT Goal PT       Start:  10/25/23    Expected End:  12/22/23       -Patient will have improved strength and endurance of pelvic floor to complete 50 reps of kegels /day without compensation...2 weeks  -Patient will have improved coordination and strength of pelvic floor to engage pelvic floor prior to coughing/sneezing to reduce/eliminate stress incontinence...4 weeks  -Patient will have improved strength and endurance of core muscles to complete 100 reps of kegels per day for 1 week...4 weeks  -Patient will have no episodes of stress incontinence x1 week...6 weeks  -Patient will demonstrate good ability to isolate and contract Transverse ABdominus muscle to assist with pelvic stabilization during lifting, carrying, exercises to help reduce urinary leakage and to support back...4weeks  -patient will have good strength/stability of core musculature to stabilize pelvis in alignment and eliminate functional leg length discrepancy...6 weeks  -patient will report improved ease with dilator use for pelvic floor stretching.. 4weeks  -patient will report 2/10 or less R hip/pelvic pain.. 6 weeks  -patient will report independence with HEP             "

## 2023-11-03 ENCOUNTER — TREATMENT (OUTPATIENT)
Dept: PHYSICAL THERAPY | Facility: CLINIC | Age: 23
End: 2023-11-03
Payer: COMMERCIAL

## 2023-11-03 DIAGNOSIS — R10.2 PELVIC PAIN: ICD-10-CM

## 2023-11-03 DIAGNOSIS — N94.10 UNSPECIFIED DYSPAREUNIA: ICD-10-CM

## 2023-11-03 PROCEDURE — 97140 MANUAL THERAPY 1/> REGIONS: CPT | Mod: GP,CQ

## 2023-11-03 PROCEDURE — 97110 THERAPEUTIC EXERCISES: CPT | Mod: GP,CQ

## 2023-11-03 ASSESSMENT — PAIN - FUNCTIONAL ASSESSMENT: PAIN_FUNCTIONAL_ASSESSMENT: 0-10

## 2023-11-03 ASSESSMENT — PAIN SCALES - GENERAL: PAINLEVEL_OUTOF10: 4

## 2023-11-03 NOTE — PROGRESS NOTES
"Physical Therapy Treatment    Patient Name: Mable Carrillo  MRN: [id number]  Today's Date: 11/3/2023  Time Calculation  Start Time: 0934  Stop Time: 1017  Time Calculation (min): 43 min      Assessment:Patient identified by name and birth date. IASTM/STM  and cupping completed to reduce pain and soft tissue restriction in  L side LB/hip musculature. After manual therapy pain had decreased by 50%.  Checked pelvic symmetry and no intervention needed. She completed all core stability exercises without c/o.     Plan: Continue with manual therapy to reduce soft tissue restriction and pain and pelvic/core strengthening to allow  improved trunk/pelvic stability for ease with lifting/carrying without reports of Sxs. -CG         Treatment/Interventions: Biofeedback, Education/ Instruction, Manual therapy, Neuromuscular re-education, Self care/ home management, Therapeutic exercises  PT Plan: Skilled PT  PT Frequency: 2 times per week  Duration: 4 weeks  Onset Date: 04/01/23  Rehab Potential: Good  Current Problem  Problem List Items Addressed This Visit             ICD-10-CM       Gastrointestinal and Abdominal    Pelvic pain R10.2     Other Visit Diagnoses         Codes    Unspecified dyspareunia     N94.10              Subjective   She reports 4/10 pain at L groin and along IT band. States tightness in same region and difficulty standing/walking long time frames.       Precautions  Precautions  Precautions Comment: no fall risk  Pain  Pain Assessment: 0-10  Pain Score: 4  Pain Type: Acute pain  Pain Location: Back  Pain Orientation: Lower  Effect of Pain on Daily Activities: Pain with activity only      Treatments:        MET : Not Needed   - basic kegels (1 second hold/relax)   - elevator kegels: 2-levels,   -long hold kegels : 3\" holds x10, instructed 3\" holds 10 reps   -quick flick educated on   -deep abdominal breathing completed/instructed for HEP  -TrA phuong plantigrade   -piriformis stretch supine 30 \" " "completed and instructed for HEP  -QL stretch doorway  30 \" completed and instructed for HEP  -hip flexor stretch on chair   -adductor stretch (side lunge)  30 \"  -hamstrings A once pelvis is stabilized/MET not needed  Plantigrade Hip Extension 2x10  each (P reps)   Standing Hip Abd with TrA 2x10 each (P reps)  Wall Slides with TrA 10x (N_)  Rows with TrA purple cords 2x10 (P reps)  Pull Downs purple cords 2x10 (P reps)      MANUAL  IASTM/STM: to L: QL glutes paraspinals  quads IT band psoas                                      HG9 brush J hook frame Bevel Up 0-30*    Cupping to L: QL glutes  IT band hams       OP EDUCATION:       Goals:  Active       PT Problem       PT Goal PT       Start:  10/25/23    Expected End:  12/22/23       -Patient will have improved strength and endurance of pelvic floor to complete 50 reps of kegels /day without compensation...2 weeks  -Patient will have improved coordination and strength of pelvic floor to engage pelvic floor prior to coughing/sneezing to reduce/eliminate stress incontinence...4 weeks  -Patient will have improved strength and endurance of core muscles to complete 100 reps of kegels per day for 1 week...4 weeks  -Patient will have no episodes of stress incontinence x1 week...6 weeks  -Patient will demonstrate good ability to isolate and contract Transverse ABdominus muscle to assist with pelvic stabilization during lifting, carrying, exercises to help reduce urinary leakage and to support back...4weeks  -patient will have good strength/stability of core musculature to stabilize pelvis in alignment and eliminate functional leg length discrepancy...6 weeks  -patient will report improved ease with dilator use for pelvic floor stretching.. 4weeks  -patient will report 2/10 or less R hip/pelvic pain.. 6 weeks  -patient will report independence with HEP             "

## 2023-11-15 ENCOUNTER — APPOINTMENT (OUTPATIENT)
Dept: PHYSICAL THERAPY | Facility: CLINIC | Age: 23
End: 2023-11-15
Payer: COMMERCIAL

## 2023-11-17 ENCOUNTER — TREATMENT (OUTPATIENT)
Dept: PHYSICAL THERAPY | Facility: CLINIC | Age: 23
End: 2023-11-17
Payer: COMMERCIAL

## 2023-11-17 DIAGNOSIS — N94.10 UNSPECIFIED DYSPAREUNIA: ICD-10-CM

## 2023-11-17 DIAGNOSIS — R10.2 PELVIC PAIN: ICD-10-CM

## 2023-11-17 PROCEDURE — 97110 THERAPEUTIC EXERCISES: CPT | Mod: GP,CQ

## 2023-11-17 ASSESSMENT — PAIN - FUNCTIONAL ASSESSMENT: PAIN_FUNCTIONAL_ASSESSMENT: 0-10

## 2023-11-17 ASSESSMENT — PAIN SCALES - GENERAL: PAINLEVEL_OUTOF10: 3

## 2023-11-17 NOTE — PROGRESS NOTES
"Physical Therapy Treatment    Patient Name: Mable Carrillo  MRN: [id number]  Today's Date: 2023  Time Calculation  Start Time: 0755  Stop Time: 0832  Time Calculation (min): 37 min      Assessment:   Patient identified by name & . Treatment consisted of ex's and manual therapy. MET completed today for L anterior rotation of pelvis. Patient able to complete ex's with no difficulty. Unable to finish all ex's today due to patient 10' late today.     Plan:  Continue with pelvic floor and core strengthening for maintaining pelvic alignment and decreasing pain for ease of work and household tasks. -MA   OP PT Plan  Treatment/Interventions: Biofeedback, Education/ Instruction, Manual therapy, Neuromuscular re-education, Self care/ home management, Therapeutic exercises  PT Plan: Skilled PT  PT Frequency: 2 times per week  Duration: 4 weeks  Onset Date: 23  Rehab Potential: Good    Current Problem  Problem List Items Addressed This Visit             ICD-10-CM    Pelvic pain R10.2     Other Visit Diagnoses         Codes    Unspecified dyspareunia     N94.10            Subjective   Reports that she had to go on a work trip for the past 2 days and sat a lot. She also had an allergic reaction to chamomile and had to to the urgent care and the gave her a shot in her right buttocks.      Precautions  Precautions  Precautions Comment: no fall risk    Pain  Pain Assessment: 0-10  Pain Score: 3 (when walking)  Pain Location: Back (and into the top of left hip)  Pain Orientation: Lower  Effect of Pain on Daily Activities: pain with gait when left leg is fully extended and when she bend over      Treatments:  Therapeutic Exercises: 25'   MET to correct pelvis L anterior/R post  innominate rotation (resist ext on L and flex on R) 5\" x5 f/b  shotgun, f/b L illeopsoas release (N),  f/b kegels:   - basic kegels (1 second hold/relax)   - elevator kegels: 2-levels,   -long hold kegels : 3\" holds x10  -quick flick " "kegel x10 (N)  -TrA phuong plantigrade 5\" x10   -piriformis stretch supine 30 \"x2 B   -QL stretch doorway  30 \" (X, no time)  -hip flexor stretch on chair (X, no time)   -adductor stretch (side lunge)  30 \" (X, no time)   -hamstrings A once pelvis is stabilized/MET not needed (X)   Plantigrade Hip Extension 2x10  each (P reps) (X, no time)    Standing Hip Abd with TrA 2x10 each (P reps) (X, no time)   Wall Slides with TrA 10x (X, no time)  Rows with TrA purple cords 2x10 (P reps) (X, no time)   Pull Downs purple cords 2x10 (P reps) (X, no time)   -deep abdominal breathing completed/instructed for HEP    MANUAL x 10'  IASTM/STM: to L: QL glutes paraspinals  quads IT band psoas                                      HG9 brush J hook frame Bevel Up 0-30*    Cupping to L: QL glutes  IT band hams     OP EDUCATION:   10/25/23 PF phuong prior to stress, PF inhibition techniques with urge,  Basic Kegel, 2 level elevator Kegel, long hold Kegel, deep abdominal breathing, piriformis stretch, QL doorway stretch, TrA isometic in platigrade, QL stretch, adductor stretch, Body mechanics and back safety education   11/17/23 Quick flick Kegel  Goals:  Active       PT Problem       PT Goal PT       Start:  10/25/23    Expected End:  12/22/23       -Patient will have improved strength and endurance of pelvic floor to complete 50 reps of kegels /day without compensation...2 weeks  -Patient will have improved coordination and strength of pelvic floor to engage pelvic floor prior to coughing/sneezing to reduce/eliminate stress incontinence...4 weeks  -Patient will have improved strength and endurance of core muscles to complete 100 reps of kegels per day for 1 week...4 weeks  -Patient will have no episodes of stress incontinence x1 week...6 weeks  -Patient will demonstrate good ability to isolate and contract Transverse ABdominus muscle to assist with pelvic stabilization during lifting, carrying, exercises to help reduce urinary leakage and " to support back...4weeks  -patient will have good strength/stability of core musculature to stabilize pelvis in alignment and eliminate functional leg length discrepancy...6 weeks  -patient will report improved ease with dilator use for pelvic floor stretching.. 4weeks  -patient will report 2/10 or less R hip/pelvic pain.. 6 weeks  -patient will report independence with HEP

## 2023-11-20 ENCOUNTER — TELEPHONE (OUTPATIENT)
Dept: PRIMARY CARE | Facility: CLINIC | Age: 23
End: 2023-11-20
Payer: COMMERCIAL

## 2023-11-22 ENCOUNTER — DOCUMENTATION (OUTPATIENT)
Dept: PHYSICAL THERAPY | Facility: CLINIC | Age: 23
End: 2023-11-22
Payer: COMMERCIAL

## 2023-11-22 ENCOUNTER — APPOINTMENT (OUTPATIENT)
Dept: PHYSICAL THERAPY | Facility: CLINIC | Age: 23
End: 2023-11-22
Payer: COMMERCIAL

## 2023-11-22 NOTE — PROGRESS NOTES
"Physical Therapy                 Therapy Communication Note    Patient Name: Mable Carrillo \"Jaqueline\"  MRN: [id number]  Today's Date: 11/22/2023     Discipline: Physical Therapy    Missed Visit Reason:  Patient ill     Missed Time: Cancel      "

## 2023-11-28 ENCOUNTER — OFFICE VISIT (OUTPATIENT)
Dept: PRIMARY CARE | Facility: CLINIC | Age: 23
End: 2023-11-28
Payer: COMMERCIAL

## 2023-11-28 VITALS
TEMPERATURE: 97.8 F | DIASTOLIC BLOOD PRESSURE: 91 MMHG | BODY MASS INDEX: 41.36 KG/M2 | WEIGHT: 233.4 LBS | HEART RATE: 102 BPM | SYSTOLIC BLOOD PRESSURE: 149 MMHG | HEIGHT: 63 IN

## 2023-11-28 DIAGNOSIS — M13.0 POLYARTHROPATHY: ICD-10-CM

## 2023-11-28 DIAGNOSIS — R60.0 LOCALIZED EDEMA: ICD-10-CM

## 2023-11-28 DIAGNOSIS — L70.0 ACNE VULGARIS: ICD-10-CM

## 2023-11-28 DIAGNOSIS — G56.22 ULNAR NEUROPATHY AT ELBOW OF LEFT UPPER EXTREMITY: Primary | ICD-10-CM

## 2023-11-28 PROCEDURE — 99214 OFFICE O/P EST MOD 30 MIN: CPT | Performed by: PHYSICIAN ASSISTANT

## 2023-11-28 PROCEDURE — 3008F BODY MASS INDEX DOCD: CPT | Performed by: PHYSICIAN ASSISTANT

## 2023-11-28 PROCEDURE — 1036F TOBACCO NON-USER: CPT | Performed by: PHYSICIAN ASSISTANT

## 2023-11-28 RX ORDER — CLINDAMYCIN PHOSPHATE 10 UG/ML
LOTION TOPICAL DAILY
COMMUNITY

## 2023-11-28 RX ORDER — KETOCONAZOLE 20 MG/ML
SHAMPOO, SUSPENSION TOPICAL 2 TIMES WEEKLY
COMMUNITY

## 2023-11-28 RX ORDER — SPIRONOLACTONE 25 MG/1
25 TABLET ORAL DAILY
Qty: 30 TABLET | Refills: 5 | Status: SHIPPED | OUTPATIENT
Start: 2023-11-28 | End: 2024-03-14 | Stop reason: SDUPTHER

## 2023-11-28 ASSESSMENT — PATIENT HEALTH QUESTIONNAIRE - PHQ9
2. FEELING DOWN, DEPRESSED OR HOPELESS: NOT AT ALL
SUM OF ALL RESPONSES TO PHQ9 QUESTIONS 1 AND 2: 0
1. LITTLE INTEREST OR PLEASURE IN DOING THINGS: NOT AT ALL

## 2023-11-28 NOTE — PROGRESS NOTES
"Subjective   Patient ID: Mable Carrillo \"Jaqueline\" is a 23 y.o. female who presents for Earache (Patient having bilateral ear discomfort x 2 days with trouble hearing x 5 days and right ear ringing.) and Hand Pain (Patient having left 4th and 5th finger with swelling, numbness and discomfort x 2 weeks, no known injury./).  HPI  Patient presents with several complaints.    Patient reports waking up 2 weeks ago with numbness and tingling in the ulnar distribution on the left arm.  No known precipitating event.  Symptoms are improving conservatively.    Patient requesting ear exam.  Patient reports possible reduced hearing over the past several days.  No fever, chills, or preceding upper respiratory infection reported.    Patient is also requesting gene testing for psoriatic arthritis.  Patient does have chronically elevated ESR without known cause.  Patient is scheduled to see rheumatology.  Patient discussed this with dermatology and recommended obtaining HLA B271 typing prior to seeing rheumatology.    Dermatology also requesting possible change from hydrochlorothiazide to spironolactone.  Patient does have chronic acne and while the Retin-A is working on the face, the patient reports worsening outbreaks on the shoulders and trunk.    Review of Systems    Constitutional:  See HPI    ENT: See HPI  Musculoskeletal: See HPI  Integumentary: See HPI  Neurologic: See HPI  All other systems are negative     Objective     BP (!) 149/91   Pulse 102   Temp 36.6 °C (97.8 °F) (Temporal)   Ht 1.6 m (5' 3\")   Wt 106 kg (233 lb 6.4 oz)   BMI 41.34 kg/m²     Physical Exam    General:  Alert and oriented, No acute distress.    Eye:  Pupils are equal, round and reactive to light, Normal conjunctiva.    HENT:  Normocephalic,   Neck:  Supple    Respiratory: Respirations are non-labored   Musculoskeletal: Normal ROM and strength; gross sensory is intact; equal  strength noted  Integumentary:  Warm, Dry, Intact, No pallor, " No rash.    Neurologic:  Alert, Oriented, Normal sensory, Cranial Nerves II-XII are grossly intact  Psychiatric:  Cooperative, Appropriate mood & affect.    Assessment/Plan   Left ulnar neuropathy: Observe    Polyarthropathy: Genetic testing ordered at patient request.  Further recommendations pending results.    Acne vulgaris: Change hydrochlorothiazide to spironolactone.  Patient advised to stop taking potassium as well.  Initially, they hydrochlorothiazide was started for localized edema.    Patient's blood pressure was elevated today however, patient reports normal readings at other facilities recently.  Will monitor moving forward.  Problem List Items Addressed This Visit       Polyarthropathy    Relevant Orders    HLAB27 Typing    Localized edema    Relevant Medications    spironolactone (Aldactone) 25 mg tablet     Other Visit Diagnoses       Ulnar neuropathy at elbow of left upper extremity    -  Primary    Acne vulgaris        Relevant Medications    spironolactone (Aldactone) 25 mg tablet            Final diagnoses:   [G56.22] Ulnar neuropathy at elbow of left upper extremity   [R60.0] Localized edema   [L70.0] Acne vulgaris   [M13.0] Polyarthropathy

## 2023-12-01 ENCOUNTER — TREATMENT (OUTPATIENT)
Dept: PHYSICAL THERAPY | Facility: CLINIC | Age: 23
End: 2023-12-01
Payer: COMMERCIAL

## 2023-12-01 DIAGNOSIS — N94.10 UNSPECIFIED DYSPAREUNIA: ICD-10-CM

## 2023-12-01 DIAGNOSIS — R10.2 PELVIC PAIN: ICD-10-CM

## 2023-12-01 PROCEDURE — 97110 THERAPEUTIC EXERCISES: CPT | Mod: GP,CQ

## 2023-12-01 PROCEDURE — 97140 MANUAL THERAPY 1/> REGIONS: CPT | Mod: GP,CQ

## 2023-12-01 ASSESSMENT — PAIN SCALES - GENERAL: PAINLEVEL_OUTOF10: 4

## 2023-12-01 ASSESSMENT — PAIN - FUNCTIONAL ASSESSMENT: PAIN_FUNCTIONAL_ASSESSMENT: 0-10

## 2023-12-01 NOTE — PROGRESS NOTES
"Physical Therapy Treatment    Patient Name: Mable Carrillo  MRN: [id number]  Today's Date: 2023  Time Calculation  Start Time: 753  Stop Time: 827  Time Calculation (min): 34 min      Assessment:   Patient identified by name & . She was late to session by 8 mins. Checked pelvic symmetry and slight rotation corrected with long axis distraction of R LE.  IASTM/STM completed to reduce pain and soft tissue restriction  more localized in L hip flexors.       Plan:Continue with manual therapy to reduce soft tissue restriction and pain  as needed and  core /pelvic floor strengthening to allow  improved trunk stability for ease with work  and household ADLs. -CG.           Current Problem  Problem List Items Addressed This Visit             ICD-10-CM       Gastrointestinal and Abdominal    Pelvic pain R10.2     Other Visit Diagnoses         Codes    Unspecified dyspareunia     N94.10            Subjective  Patient has been ill past 2 weeks and experiencing hypertension. She reports new meds to address.  Notes pain in L low back that wraps around her hip. Feels she has made progress and now Sxs more localized .                  Precautions  Precautions  Precautions Comment: no fall risk    Pain  Pain Assessment: 0-10  Pain Score: 4  Pain Type: Acute pain  Pain Location: Back  Pain Orientation: Left, Lower      Treatments:  Therapeutic Exercises: 25'    MET:  slight rotation corrected with long axis distraction of R LE   - basic kegels (1 second hold/relax)   - elevator kegels: 2-levels,   -long hold kegels : 3\" holds x10  -quick flick kegel x10 (N)  -TrA phuong plantigrade 5\" x10   -piriformis stretch supine 30 \"x2 B   -QL stretch doorway  30 \" (X, no time)  -hip flexor stretch off side of table 3x 30\"   -adductor stretch supine 3x 30\"   -hamstrings A once pelvis is stabilized/MET not needed (X)   Plantigrade Hip Extension 2x10  each    Standing Hip Abd with TrA 2x10 each   Wall Slides with TrA 10x   Rows " with TrA purple cords 2x10   Pull Downs purple cords 2x10   -deep abdominal breathing completed/instructed for HEP    MANUAL x 10'  IASTM/STM: to L: QL glutes paraspinals  quads IT band psoas                                      HG9 brush J hook frame Bevel Up 0-30*    Cupping to L: QL glutes  IT band hams (X)    OP EDUCATION:   10/25/23 PF phuong prior to stress, PF inhibition techniques with urge,  Basic Kegel, 2 level elevator Kegel, long hold Kegel, deep abdominal breathing, piriformis stretch, QL doorway stretch, TrA isometic in platigrade, QL stretch, adductor stretch, Body mechanics and back safety education   11/17/23 Quick flick Kegel  Goals:  Active       PT Problem       PT Goal PT       Start:  10/25/23    Expected End:  12/22/23       -Patient will have improved strength and endurance of pelvic floor to complete 50 reps of kegels /day without compensation...2 weeks  -Patient will have improved coordination and strength of pelvic floor to engage pelvic floor prior to coughing/sneezing to reduce/eliminate stress incontinence...4 weeks  -Patient will have improved strength and endurance of core muscles to complete 100 reps of kegels per day for 1 week...4 weeks  -Patient will have no episodes of stress incontinence x1 week...6 weeks  -Patient will demonstrate good ability to isolate and contract Transverse ABdominus muscle to assist with pelvic stabilization during lifting, carrying, exercises to help reduce urinary leakage and to support back...4weeks  -patient will have good strength/stability of core musculature to stabilize pelvis in alignment and eliminate functional leg length discrepancy...6 weeks  -patient will report improved ease with dilator use for pelvic floor stretching.. 4weeks  -patient will report 2/10 or less R hip/pelvic pain.. 6 weeks  -patient will report independence with HEP

## 2023-12-08 ENCOUNTER — DOCUMENTATION (OUTPATIENT)
Dept: PHYSICAL THERAPY | Facility: CLINIC | Age: 23
End: 2023-12-08
Payer: COMMERCIAL

## 2023-12-08 NOTE — PROGRESS NOTES
"Physical Therapy                 Therapy Communication Note    Patient Name: Mable Carrillo \"Jaqueline\"  MRN: [id number]  Today's Date: 12/8/2023     Discipline: Physical Therapy    Missed Visit Reason:      Missed Time: No Show    Comment:  "

## 2023-12-27 ENCOUNTER — LAB (OUTPATIENT)
Dept: LAB | Facility: LAB | Age: 23
End: 2023-12-27
Payer: COMMERCIAL

## 2023-12-27 DIAGNOSIS — M13.0 POLYARTHROPATHY: ICD-10-CM

## 2023-12-27 PROCEDURE — 81381 HLA I TYPING 1 ALLELE HR: CPT

## 2023-12-27 PROCEDURE — 36415 COLL VENOUS BLD VENIPUNCTURE: CPT

## 2024-01-05 LAB — HLAB27 TYPING: NEGATIVE

## 2024-01-25 ENCOUNTER — LAB (OUTPATIENT)
Dept: LAB | Facility: LAB | Age: 24
End: 2024-01-25
Payer: COMMERCIAL

## 2024-01-25 DIAGNOSIS — Z79.1 LONG TERM (CURRENT) USE OF NON-STEROIDAL ANTI-INFLAMMATORIES (NSAID): ICD-10-CM

## 2024-01-25 DIAGNOSIS — M75.22 BICIPITAL TENDINITIS, LEFT SHOULDER: ICD-10-CM

## 2024-01-25 DIAGNOSIS — Z84.0 FAMILY HISTORY OF DISEASES OF THE SKIN AND SUBCUTANEOUS TISSUE: ICD-10-CM

## 2024-01-25 DIAGNOSIS — M25.552 PAIN IN LEFT HIP: ICD-10-CM

## 2024-01-25 DIAGNOSIS — Z87.39 PERSONAL HISTORY OF OTHER DISEASES OF THE MUSCULOSKELETAL SYSTEM AND CONNECTIVE TISSUE: ICD-10-CM

## 2024-01-25 DIAGNOSIS — M94.9 DISORDER OF CARTILAGE, UNSPECIFIED: ICD-10-CM

## 2024-01-25 DIAGNOSIS — M79.7 FIBROMYALGIA: ICD-10-CM

## 2024-01-25 DIAGNOSIS — M25.431 EFFUSION, RIGHT WRIST: ICD-10-CM

## 2024-01-25 DIAGNOSIS — R53.83 OTHER FATIGUE: ICD-10-CM

## 2024-01-25 DIAGNOSIS — M79.642 PAIN IN LEFT HAND: ICD-10-CM

## 2024-01-25 DIAGNOSIS — R73.09 OTHER ABNORMAL GLUCOSE: ICD-10-CM

## 2024-01-25 DIAGNOSIS — Z83.79 FAMILY HISTORY OF OTHER DISEASES OF THE DIGESTIVE SYSTEM: ICD-10-CM

## 2024-01-25 DIAGNOSIS — M25.562 PAIN IN LEFT KNEE: ICD-10-CM

## 2024-01-25 DIAGNOSIS — M54.2 CERVICALGIA: ICD-10-CM

## 2024-01-25 DIAGNOSIS — M54.9 DORSALGIA, UNSPECIFIED: ICD-10-CM

## 2024-01-25 DIAGNOSIS — G89.29 OTHER CHRONIC PAIN: ICD-10-CM

## 2024-01-25 DIAGNOSIS — M75.52 BURSITIS OF LEFT SHOULDER: ICD-10-CM

## 2024-01-25 DIAGNOSIS — M25.561 PAIN IN RIGHT KNEE: ICD-10-CM

## 2024-01-25 DIAGNOSIS — M89.9 DISORDER OF BONE, UNSPECIFIED: ICD-10-CM

## 2024-01-25 DIAGNOSIS — J33.1 POLYPOID SINUS DEGENERATION: ICD-10-CM

## 2024-01-25 DIAGNOSIS — M75.21 BICIPITAL TENDINITIS, RIGHT SHOULDER: ICD-10-CM

## 2024-01-25 DIAGNOSIS — J33.8 OTHER POLYP OF SINUS: ICD-10-CM

## 2024-01-25 DIAGNOSIS — M79.641 PAIN IN RIGHT HAND: ICD-10-CM

## 2024-01-25 DIAGNOSIS — E55.9 VITAMIN D DEFICIENCY, UNSPECIFIED: ICD-10-CM

## 2024-01-25 DIAGNOSIS — R29.898 OTHER SYMPTOMS AND SIGNS INVOLVING THE MUSCULOSKELETAL SYSTEM: ICD-10-CM

## 2024-01-25 DIAGNOSIS — M70.52 OTHER BURSITIS OF KNEE, LEFT KNEE: ICD-10-CM

## 2024-01-25 DIAGNOSIS — M21.41 FLAT FOOT (PES PLANUS) (ACQUIRED), RIGHT FOOT: ICD-10-CM

## 2024-01-25 DIAGNOSIS — J30.5 ALLERGIC RHINITIS DUE TO FOOD: Primary | ICD-10-CM

## 2024-01-25 DIAGNOSIS — R70.0 ELEVATED ERYTHROCYTE SEDIMENTATION RATE: ICD-10-CM

## 2024-01-25 DIAGNOSIS — M70.62 TROCHANTERIC BURSITIS, LEFT HIP: ICD-10-CM

## 2024-01-25 DIAGNOSIS — M70.51 OTHER BURSITIS OF KNEE, RIGHT KNEE: ICD-10-CM

## 2024-01-25 DIAGNOSIS — Z82.69 FAMILY HISTORY OF OTHER DISEASES OF THE MUSCULOSKELETAL SYSTEM AND CONNECTIVE TISSUE: ICD-10-CM

## 2024-01-25 DIAGNOSIS — R73.03 PREDIABETES: ICD-10-CM

## 2024-01-25 DIAGNOSIS — M75.51 BURSITIS OF RIGHT SHOULDER: ICD-10-CM

## 2024-01-25 DIAGNOSIS — M21.42 FLAT FOOT (PES PLANUS) (ACQUIRED), LEFT FOOT: ICD-10-CM

## 2024-01-25 DIAGNOSIS — M25.532 PAIN IN LEFT WRIST: ICD-10-CM

## 2024-01-25 LAB
25(OH)D3 SERPL-MCNC: 31 NG/ML (ref 30–100)
ALBUMIN SERPL BCP-MCNC: 4.6 G/DL (ref 3.4–5)
ALP SERPL-CCNC: 76 U/L (ref 33–110)
ALT SERPL W P-5'-P-CCNC: 19 U/L (ref 7–45)
ANION GAP SERPL CALC-SCNC: 14 MMOL/L (ref 10–20)
APPEARANCE UR: ABNORMAL
AST SERPL W P-5'-P-CCNC: 20 U/L (ref 9–39)
BASOPHILS # BLD AUTO: 0.07 X10*3/UL (ref 0–0.1)
BASOPHILS NFR BLD AUTO: 0.8 %
BILIRUB SERPL-MCNC: 0.3 MG/DL (ref 0–1.2)
BILIRUB UR STRIP.AUTO-MCNC: NEGATIVE MG/DL
BUN SERPL-MCNC: 12 MG/DL (ref 6–23)
CALCIUM SERPL-MCNC: 9.6 MG/DL (ref 8.6–10.3)
CHLORIDE SERPL-SCNC: 103 MMOL/L (ref 98–107)
CK SERPL-CCNC: 81 U/L (ref 0–215)
CO2 SERPL-SCNC: 26 MMOL/L (ref 21–32)
COLOR UR: YELLOW
CREAT SERPL-MCNC: 0.74 MG/DL (ref 0.5–1.05)
CRP SERPL-MCNC: 1.21 MG/DL
EGFRCR SERPLBLD CKD-EPI 2021: >90 ML/MIN/1.73M*2
EOSINOPHIL # BLD AUTO: 0.2 X10*3/UL (ref 0–0.7)
EOSINOPHIL NFR BLD AUTO: 2.4 %
ERYTHROCYTE [DISTWIDTH] IN BLOOD BY AUTOMATED COUNT: 13.8 % (ref 11.5–14.5)
ERYTHROCYTE [SEDIMENTATION RATE] IN BLOOD BY WESTERGREN METHOD: 58 MM/H (ref 0–20)
GLUCOSE SERPL-MCNC: 73 MG/DL (ref 74–99)
GLUCOSE UR STRIP.AUTO-MCNC: NEGATIVE MG/DL
HCT VFR BLD AUTO: 43.7 % (ref 36–46)
HGB BLD-MCNC: 14.3 G/DL (ref 12–16)
IMM GRANULOCYTES # BLD AUTO: 0.02 X10*3/UL (ref 0–0.7)
IMM GRANULOCYTES NFR BLD AUTO: 0.2 % (ref 0–0.9)
KETONES UR STRIP.AUTO-MCNC: NEGATIVE MG/DL
LEUKOCYTE ESTERASE UR QL STRIP.AUTO: NEGATIVE
LYMPHOCYTES # BLD AUTO: 2.62 X10*3/UL (ref 1.2–4.8)
LYMPHOCYTES NFR BLD AUTO: 31.3 %
MAGNESIUM SERPL-MCNC: 2.05 MG/DL (ref 1.6–2.4)
MCH RBC QN AUTO: 28 PG (ref 26–34)
MCHC RBC AUTO-ENTMCNC: 32.7 G/DL (ref 32–36)
MCV RBC AUTO: 86 FL (ref 80–100)
MONOCYTES # BLD AUTO: 0.57 X10*3/UL (ref 0.1–1)
MONOCYTES NFR BLD AUTO: 6.8 %
NEUTROPHILS # BLD AUTO: 4.88 X10*3/UL (ref 1.2–7.7)
NEUTROPHILS NFR BLD AUTO: 58.5 %
NITRITE UR QL STRIP.AUTO: NEGATIVE
NRBC BLD-RTO: 0 /100 WBCS (ref 0–0)
PH UR STRIP.AUTO: 6 [PH]
PLATELET # BLD AUTO: 447 X10*3/UL (ref 150–450)
POTASSIUM SERPL-SCNC: 4.1 MMOL/L (ref 3.5–5.3)
PROT SERPL-MCNC: 7.7 G/DL (ref 6.4–8.2)
PROT UR STRIP.AUTO-MCNC: NEGATIVE MG/DL
RBC # BLD AUTO: 5.1 X10*6/UL (ref 4–5.2)
RBC # UR STRIP.AUTO: NEGATIVE /UL
SODIUM SERPL-SCNC: 139 MMOL/L (ref 136–145)
SP GR UR STRIP.AUTO: 1.02
URATE SERPL-MCNC: 5.9 MG/DL (ref 2.3–6.7)
UROBILINOGEN UR STRIP.AUTO-MCNC: <2 MG/DL
WBC # BLD AUTO: 8.4 X10*3/UL (ref 4.4–11.3)

## 2024-01-25 PROCEDURE — 83516 IMMUNOASSAY NONANTIBODY: CPT

## 2024-01-25 PROCEDURE — 84165 PROTEIN E-PHORESIS SERUM: CPT

## 2024-01-25 PROCEDURE — 84550 ASSAY OF BLOOD/URIC ACID: CPT

## 2024-01-25 PROCEDURE — 82550 ASSAY OF CK (CPK): CPT

## 2024-01-25 PROCEDURE — 85652 RBC SED RATE AUTOMATED: CPT

## 2024-01-25 PROCEDURE — 84165 PROTEIN E-PHORESIS SERUM: CPT | Performed by: INTERNAL MEDICINE

## 2024-01-25 PROCEDURE — 84145 PROCALCITONIN (PCT): CPT

## 2024-01-25 PROCEDURE — 80053 COMPREHEN METABOLIC PANEL: CPT

## 2024-01-25 PROCEDURE — 81003 URINALYSIS AUTO W/O SCOPE: CPT

## 2024-01-25 PROCEDURE — 82164 ANGIOTENSIN I ENZYME TEST: CPT

## 2024-01-25 PROCEDURE — 86036 ANCA SCREEN EACH ANTIBODY: CPT

## 2024-01-25 PROCEDURE — 87449 NOS EACH ORGANISM AG IA: CPT

## 2024-01-25 PROCEDURE — 85025 COMPLETE CBC W/AUTO DIFF WBC: CPT

## 2024-01-25 PROCEDURE — 82306 VITAMIN D 25 HYDROXY: CPT

## 2024-01-25 PROCEDURE — 86003 ALLG SPEC IGE CRUDE XTRC EA: CPT

## 2024-01-25 PROCEDURE — 86320 SERUM IMMUNOELECTROPHORESIS: CPT | Performed by: INTERNAL MEDICINE

## 2024-01-25 PROCEDURE — 86200 CCP ANTIBODY: CPT

## 2024-01-25 PROCEDURE — 36415 COLL VENOUS BLD VENIPUNCTURE: CPT

## 2024-01-25 PROCEDURE — 80074 ACUTE HEPATITIS PANEL: CPT

## 2024-01-25 PROCEDURE — 82652 VIT D 1 25-DIHYDROXY: CPT

## 2024-01-25 PROCEDURE — 86140 C-REACTIVE PROTEIN: CPT

## 2024-01-25 PROCEDURE — 83735 ASSAY OF MAGNESIUM: CPT

## 2024-01-25 PROCEDURE — 87476 LYME DIS DNA AMP PROBE: CPT

## 2024-01-25 PROCEDURE — 82785 ASSAY OF IGE: CPT

## 2024-01-25 PROCEDURE — 84155 ASSAY OF PROTEIN SERUM: CPT

## 2024-01-25 PROCEDURE — 86334 IMMUNOFIX E-PHORESIS SERUM: CPT

## 2024-01-26 LAB
CCP IGG SERPL-ACNC: <1 U/ML
CLAM IGE QN: <0.1 KU/L
CODFISH IGE QN: <0.1 KU/L
CORN IGE QN: <0.1
EGG WHITE IGE QN: 0.31 KU/L
HAV IGM SER QL: NONREACTIVE
HBV CORE IGM SER QL: NONREACTIVE
HBV SURFACE AG SERPL QL IA: NONREACTIVE
HCV AB SER QL: NONREACTIVE
IGE SERPL-ACNC: 22 IU/ML (ref 0–214)
MILK IGE QN: 0.34 KU/L
PEANUT IGE QN: 0.16 KU/L
PROCALCITONIN SERPL-MCNC: <0.02 NG/ML
PROT SERPL-MCNC: 8.1 G/DL (ref 6.4–8.2)
SCALLOP IGE QN: <0.1 KU/L
SESAME SEED IGE QN: 0.17 KU/L
SHRIMP IGE QN: <0.1 KU/L
SOYBEAN IGE QN: <0.1 KU/L
TTG IGA SER IA-ACNC: <1 U/ML
WALNUT IGE QN: 0.19 KU/L
WHEAT IGE QN: 0.28 KU/L

## 2024-01-27 LAB
ACE SERPL-CCNC: 25 U/L (ref 16–85)
FUNGITELL BETA-D GLUCAN,SERUM: <31 PG/ML

## 2024-01-28 LAB — 1,25(OH)2D3 SERPL-MCNC: 84.6 PG/ML (ref 19.9–79.3)

## 2024-01-29 LAB
B BURGDOR DNA SPEC QL NAA+PROBE: NOT DETECTED
SPECIMEN SOURCE: NORMAL

## 2024-01-30 ENCOUNTER — HOSPITAL ENCOUNTER (OUTPATIENT)
Dept: RADIOLOGY | Facility: HOSPITAL | Age: 24
Discharge: HOME | End: 2024-01-30
Payer: COMMERCIAL

## 2024-01-30 DIAGNOSIS — M25.562 PAIN IN LEFT KNEE: ICD-10-CM

## 2024-01-30 DIAGNOSIS — G89.29 OTHER CHRONIC PAIN: ICD-10-CM

## 2024-01-30 DIAGNOSIS — M54.2 CERVICALGIA: ICD-10-CM

## 2024-01-30 DIAGNOSIS — M25.532 PAIN IN LEFT WRIST: ICD-10-CM

## 2024-01-30 DIAGNOSIS — M75.52 BURSITIS OF LEFT SHOULDER: ICD-10-CM

## 2024-01-30 DIAGNOSIS — M25.561 PAIN IN RIGHT KNEE: ICD-10-CM

## 2024-01-30 DIAGNOSIS — M25.531 PAIN IN RIGHT WRIST: ICD-10-CM

## 2024-01-30 DIAGNOSIS — M25.552 PAIN IN LEFT HIP: ICD-10-CM

## 2024-01-30 DIAGNOSIS — R29.898 OTHER SYMPTOMS AND SIGNS INVOLVING THE MUSCULOSKELETAL SYSTEM: ICD-10-CM

## 2024-01-30 DIAGNOSIS — M75.51 BURSITIS OF RIGHT SHOULDER: ICD-10-CM

## 2024-01-30 LAB
ALBUMIN: 4.3 G/DL (ref 3.4–5)
ALPHA 1 GLOBULIN: 0.3 G/DL (ref 0.2–0.6)
ALPHA 2 GLOBULIN: 0.9 G/DL (ref 0.4–1.1)
BETA GLOBULIN: 1.2 G/DL (ref 0.5–1.2)
GAMMA GLOBULIN: 1.3 G/DL (ref 0.5–1.4)
IMMUNOFIXATION COMMENT: NORMAL
PATH REVIEW - SERUM IMMUNOFIXATION: NORMAL
PATH REVIEW-SERUM PROTEIN ELECTROPHORESIS: NORMAL
PROTEIN ELECTROPHORESIS COMMENT: NORMAL

## 2024-01-30 PROCEDURE — 73110 X-RAY EXAM OF WRIST: CPT | Mod: BILATERAL PROCEDURE | Performed by: RADIOLOGY

## 2024-01-30 PROCEDURE — 73502 X-RAY EXAM HIP UNI 2-3 VIEWS: CPT | Mod: LT

## 2024-01-30 PROCEDURE — 73562 X-RAY EXAM OF KNEE 3: CPT | Mod: 50

## 2024-01-30 PROCEDURE — 72070 X-RAY EXAM THORAC SPINE 2VWS: CPT

## 2024-01-30 PROCEDURE — 73030 X-RAY EXAM OF SHOULDER: CPT | Mod: BILATERAL PROCEDURE | Performed by: RADIOLOGY

## 2024-01-30 PROCEDURE — 73562 X-RAY EXAM OF KNEE 3: CPT | Mod: BILATERAL PROCEDURE | Performed by: RADIOLOGY

## 2024-01-30 PROCEDURE — 73130 X-RAY EXAM OF HAND: CPT | Mod: BILATERAL PROCEDURE | Performed by: RADIOLOGY

## 2024-01-30 PROCEDURE — 72110 X-RAY EXAM L-2 SPINE 4/>VWS: CPT

## 2024-01-30 PROCEDURE — 73130 X-RAY EXAM OF HAND: CPT | Mod: 50

## 2024-01-30 PROCEDURE — 73110 X-RAY EXAM OF WRIST: CPT | Mod: 50

## 2024-01-30 PROCEDURE — 72050 X-RAY EXAM NECK SPINE 4/5VWS: CPT

## 2024-01-30 PROCEDURE — 73030 X-RAY EXAM OF SHOULDER: CPT | Mod: 50

## 2024-01-30 PROCEDURE — 72202 X-RAY EXAM SI JOINTS 3/> VWS: CPT

## 2024-02-01 LAB
ANCA AB PATTERN SER IF-IMP: ABNORMAL
ANCA IGG TITR SER IF: ABNORMAL {TITER}
MYELOPEROXIDASE AB SER-ACNC: 0 AU/ML (ref 0–19)
PROTEINASE3 AB SER-ACNC: 135 AU/ML (ref 0–19)

## 2024-02-29 ENCOUNTER — OFFICE VISIT (OUTPATIENT)
Dept: PRIMARY CARE | Facility: CLINIC | Age: 24
End: 2024-02-29
Payer: COMMERCIAL

## 2024-02-29 VITALS
DIASTOLIC BLOOD PRESSURE: 72 MMHG | SYSTOLIC BLOOD PRESSURE: 158 MMHG | HEART RATE: 103 BPM | WEIGHT: 241.1 LBS | BODY MASS INDEX: 42.71 KG/M2 | OXYGEN SATURATION: 99 %

## 2024-02-29 DIAGNOSIS — G47.33 OBSTRUCTIVE SLEEP APNEA SYNDROME: ICD-10-CM

## 2024-02-29 DIAGNOSIS — I77.6 VASCULITIS (CMS-HCC): ICD-10-CM

## 2024-02-29 DIAGNOSIS — R73.03 PREDIABETES: Primary | ICD-10-CM

## 2024-02-29 DIAGNOSIS — M13.0 POLYARTHROPATHY: ICD-10-CM

## 2024-02-29 DIAGNOSIS — I15.9 SECONDARY HYPERTENSION: ICD-10-CM

## 2024-02-29 PROBLEM — R60.9 FLUID RETENTION: Status: RESOLVED | Noted: 2023-03-21 | Resolved: 2024-02-29

## 2024-02-29 PROBLEM — M70.62 GREATER TROCHANTERIC BURSITIS OF LEFT HIP: Status: RESOLVED | Noted: 2024-01-24 | Resolved: 2024-02-29

## 2024-02-29 PROBLEM — G56.20 ULNAR NEUROPATHY: Status: RESOLVED | Noted: 2024-02-29 | Resolved: 2024-02-29

## 2024-02-29 PROBLEM — R70.0 ELEVATED ERYTHROCYTE SEDIMENTATION RATE: Status: RESOLVED | Noted: 2023-09-19 | Resolved: 2024-02-29

## 2024-02-29 PROBLEM — M79.18 LUMBAR MUSCLE PAIN: Status: RESOLVED | Noted: 2024-02-26 | Resolved: 2024-02-29

## 2024-02-29 PROBLEM — R76.8 PR3 ANCA ANTIBODIES PRESENT: Status: ACTIVE | Noted: 2024-02-26

## 2024-02-29 PROBLEM — Z91.410 HISTORY OF SEXUAL ABUSE IN ADULTHOOD: Status: RESOLVED | Noted: 2020-10-11 | Resolved: 2024-02-29

## 2024-02-29 PROBLEM — R10.2 PELVIC PAIN: Status: RESOLVED | Noted: 2023-10-25 | Resolved: 2024-02-29

## 2024-02-29 PROBLEM — E87.6 HYPOKALEMIA: Status: RESOLVED | Noted: 2023-05-25 | Resolved: 2024-02-29

## 2024-02-29 PROBLEM — L70.0 ACNE VULGARIS: Status: RESOLVED | Noted: 2024-02-29 | Resolved: 2024-02-29

## 2024-02-29 PROBLEM — E66.01 OBESITY, MORBID, BMI 40.0-49.9 (MULTI): Status: ACTIVE | Noted: 2024-02-26

## 2024-02-29 PROBLEM — R03.0 ELEVATED BLOOD PRESSURE READING: Status: RESOLVED | Noted: 2023-05-25 | Resolved: 2024-02-29

## 2024-02-29 PROBLEM — E66.01 OBESITY, MORBID, BMI 40.0-49.9 (MULTI): Status: RESOLVED | Noted: 2024-02-26 | Resolved: 2024-02-29

## 2024-02-29 PROBLEM — J30.2 SEASONAL ALLERGIES: Status: RESOLVED | Noted: 2023-03-20 | Resolved: 2024-02-29

## 2024-02-29 PROBLEM — R07.89 ATYPICAL CHEST PAIN: Status: RESOLVED | Noted: 2023-03-20 | Resolved: 2024-02-29

## 2024-02-29 PROBLEM — N94.3 PREMENSTRUAL TENSION SYNDROME: Status: ACTIVE | Noted: 2023-03-20

## 2024-02-29 PROBLEM — M79.7 FIBROMYALGIA: Status: RESOLVED | Noted: 2023-03-20 | Resolved: 2024-02-29

## 2024-02-29 PROBLEM — E55.9 VITAMIN D DEFICIENCY: Status: ACTIVE | Noted: 2024-01-24

## 2024-02-29 PROCEDURE — 99214 OFFICE O/P EST MOD 30 MIN: CPT | Performed by: INTERNAL MEDICINE

## 2024-02-29 PROCEDURE — 1036F TOBACCO NON-USER: CPT | Performed by: INTERNAL MEDICINE

## 2024-02-29 PROCEDURE — 3078F DIAST BP <80 MM HG: CPT | Performed by: INTERNAL MEDICINE

## 2024-02-29 PROCEDURE — 3008F BODY MASS INDEX DOCD: CPT | Performed by: INTERNAL MEDICINE

## 2024-02-29 PROCEDURE — 3077F SYST BP >= 140 MM HG: CPT | Performed by: INTERNAL MEDICINE

## 2024-02-29 RX ORDER — CHLORTHALIDONE 25 MG/1
25 TABLET ORAL DAILY
Qty: 30 TABLET | Refills: 5 | Status: SHIPPED | OUTPATIENT
Start: 2024-02-29 | End: 2024-03-14 | Stop reason: SDUPTHER

## 2024-02-29 ASSESSMENT — ENCOUNTER SYMPTOMS
ARTHRALGIAS: 1
PALPITATIONS: 0
NAUSEA: 0
SHORTNESS OF BREATH: 0
DIARRHEA: 0
ABDOMINAL PAIN: 0
CONSTIPATION: 1
FATIGUE: 1
CHEST TIGHTNESS: 0
BLOOD IN STOOL: 0
WHEEZING: 0
COUGH: 0
VOMITING: 0

## 2024-02-29 NOTE — ASSESSMENT & PLAN NOTE
Blood pressure has been labile but I do believe she suffers from hypertension  -She will stop taking her Concerta out of concern of dangerous elevation of blood pressures  -She will refrain from decongestants  -I am adding chlorthalidone 25 mg daily to help with fluid retention and lower blood pressure  -She has agreed to go to the lab in 2 weeks to recheck electrolytes and kidney function and we will see her back shortly after that time  -We asked that she continue checking her blood pressure at home and we talked about the appropriate cuff size  -I have asked that she bring her blood pressure monitor with her  -We will look into restarting her for sleep apnea   125

## 2024-02-29 NOTE — PROGRESS NOTES
"Subjective   Patient ID: Mable Carrillo \"Jaqueline\" is a 23 y.o. female who presents for No chief complaint on file..  HPI  She is here today to get reestablished.  We last saw her in September and since that time she has been seen by Dr. Hahn, rheumatology.  She states she has been diagnosed with a vasculitis and is working with her on monitoring and treatment.  We also briefly discussed her lymphadenopathy and it was determined that it was reactive and not posing any threats.  She states that recently she has been noticing an elevation of her blood pressure.  In fact it sounds quite labile.  She states the other day she got 200/110 and other times she is getting systolic readings in the 140s.  We discussed how blood pressure lability can be influenced by medications including decongestants and also her Concerta.  She states she has been holding it and she has noticed that her blood pressure goes up about 10 points when taking the medicine.  We also discussed the possibility of sleep apnea causing some of these issues.  She did have a sleep study approximately 2 years ago which showed mild sleep apnea.  Since that time she has had a significant amount of weight gain.  She also states she has a tendency to retain fluid and I decided to add chlorthalidone to hopefully bring down the fluid retention and improve the blood pressure.  We will carefully monitor.  She is also been tested for food allergies and has multiple allergies.  She states she is particularly allergic to ragweed and chamomile.  She also had a hemoglobin A1c in September that was slightly elevated at 5.7.  We have decided to check that again.  Otherwise she does get comprehensive lab work through her rheumatologist and had some of this year.  We did briefly review those and she did have a significantly elevated sedimentation rate.  She does explain a couple of weeks ago she developed a respiratory infection with sore throat and congestion.  " She states those symptoms have improved and are on their way out.  We have decided to retest her for sleep apnea and again we will see her back in follow-up.  Review of Systems   Constitutional:  Positive for fatigue.   Respiratory:  Negative for cough, chest tightness, shortness of breath and wheezing.    Cardiovascular:  Negative for chest pain, palpitations and leg swelling.   Gastrointestinal:  Positive for constipation. Negative for abdominal pain, blood in stool, diarrhea, nausea and vomiting.   Musculoskeletal:  Positive for arthralgias.     Objective   Physical Exam  Vitals and nursing note reviewed.   Constitutional:       General: She is not in acute distress.     Appearance: Normal appearance.   HENT:      Head: Normocephalic and atraumatic.   Eyes:      Conjunctiva/sclera: Conjunctivae normal.   Cardiovascular:      Rate and Rhythm: Normal rate and regular rhythm.      Heart sounds: Normal heart sounds.   Pulmonary:      Effort: No respiratory distress.      Breath sounds: No wheezing.   Abdominal:      Palpations: Abdomen is soft.      Tenderness: There is no abdominal tenderness. There is no guarding.   Musculoskeletal:         General: No swelling. Normal range of motion.   Skin:     General: Skin is warm and dry.   Neurological:      General: No focal deficit present.      Mental Status: She is alert and oriented to person, place, and time.   Psychiatric:         Behavior: Behavior normal.       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 Hahn         Obstructive sleep apnea syndrome G47.33    Relevant Orders    In-Center Sleep Study    Prediabetes - Primary R73.03     -We will be checking her hemoglobin A1c along with other lab work in 2 weeks         Relevant Orders    Basic Metabolic Panel    Hemoglobin A1C    Secondary hypertension I15.9      Blood pressure has been labile but I do believe she suffers from hypertension  -She will stop taking her Concerta out of concern of dangerous elevation of blood pressures  -She will refrain from decongestants  -I am adding chlorthalidone 25 mg daily to help with fluid retention and lower blood pressure  -She has agreed to go to the lab in 2 weeks to recheck electrolytes and kidney function and we will see her back shortly after that time  -We asked that she continue checking her blood pressure at home and we talked about the appropriate cuff size  -I have asked that she bring her blood pressure monitor with her  -We will look into restarting her for sleep apnea         Relevant Medications    chlorthalidone (Hygroton) 25 mg tablet    Vasculitis (CMS/HCC) I77.6     -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

## 2024-02-29 NOTE — PATIENT INSTRUCTIONS
As we discussed I do believe that we need to prescribe a blood pressure medication and I have chosen chlorthalidone.  As you know this is a diuretic but we commonly also use it for blood pressure control.  My hope is that your blood pressure will improve and your fluid retention will improve.  Please continue checking your blood pressures at home and write them down for when you come back.  Please also call me if you are not doing well with this medication  The staff will also be helping to schedule your sleep study  Please remember to get fasting lab work done approximately 2 weeks from now just prior to your next visit.  We are going to be checking a hemoglobin A1c.  I decided not to check any additional lab work since you have had a lot recently and usually the rheumatologist orders lots of labs frequently

## 2024-03-04 ENCOUNTER — TELEPHONE (OUTPATIENT)
Dept: PRIMARY CARE | Facility: CLINIC | Age: 24
End: 2024-03-04
Payer: COMMERCIAL

## 2024-03-04 DIAGNOSIS — G47.33 OBSTRUCTIVE SLEEP APNEA SYNDROME: Primary | ICD-10-CM

## 2024-03-04 NOTE — TELEPHONE ENCOUNTER
Patient's insurance denied prior auth for in lab sleep study. Wants patient to do home sleep study. If you are agreeable can you put order in and I will get scheduled with patient.

## 2024-03-05 NOTE — TELEPHONE ENCOUNTER
Prior auth done. Zainab at [facility] [place] [facility] will call patient to schedule. Patient notified.

## 2024-03-13 ENCOUNTER — APPOINTMENT (OUTPATIENT)
Dept: PRIMARY CARE | Facility: CLINIC | Age: 24
End: 2024-03-13
Payer: COMMERCIAL

## 2024-03-13 ENCOUNTER — LAB (OUTPATIENT)
Dept: LAB | Facility: LAB | Age: 24
End: 2024-03-13
Payer: COMMERCIAL

## 2024-03-13 DIAGNOSIS — R70.0 ELEVATED ERYTHROCYTE SEDIMENTATION RATE: ICD-10-CM

## 2024-03-13 DIAGNOSIS — J34.1 CYST AND MUCOCELE OF NOSE AND NASAL SINUS: ICD-10-CM

## 2024-03-13 DIAGNOSIS — M31.30 WEGENER'S GRANULOMATOSIS WITHOUT RENAL INVOLVEMENT (MULTI): Primary | ICD-10-CM

## 2024-03-13 DIAGNOSIS — Z79.1 LONG TERM (CURRENT) USE OF NON-STEROIDAL ANTI-INFLAMMATORIES (NSAID): ICD-10-CM

## 2024-03-13 DIAGNOSIS — E55.9 VITAMIN D DEFICIENCY, UNSPECIFIED: ICD-10-CM

## 2024-03-13 DIAGNOSIS — M79.7 FIBROMYALGIA: ICD-10-CM

## 2024-03-13 DIAGNOSIS — M54.2 CERVICALGIA: ICD-10-CM

## 2024-03-13 DIAGNOSIS — M21.42 FLAT FOOT (PES PLANUS) (ACQUIRED), LEFT FOOT: ICD-10-CM

## 2024-03-13 DIAGNOSIS — J33.8 OTHER POLYP OF SINUS: ICD-10-CM

## 2024-03-13 DIAGNOSIS — R73.03 PREDIABETES: ICD-10-CM

## 2024-03-13 DIAGNOSIS — Z83.79 FAMILY HISTORY OF OTHER DISEASES OF THE DIGESTIVE SYSTEM: ICD-10-CM

## 2024-03-13 DIAGNOSIS — Z87.39 PERSONAL HISTORY OF OTHER DISEASES OF THE MUSCULOSKELETAL SYSTEM AND CONNECTIVE TISSUE: ICD-10-CM

## 2024-03-13 DIAGNOSIS — M62.830 MUSCLE SPASM OF BACK: ICD-10-CM

## 2024-03-13 DIAGNOSIS — M21.41 FLAT FOOT (PES PLANUS) (ACQUIRED), RIGHT FOOT: ICD-10-CM

## 2024-03-13 DIAGNOSIS — Z84.0 FAMILY HISTORY OF DISEASES OF THE SKIN AND SUBCUTANEOUS TISSUE: ICD-10-CM

## 2024-03-13 DIAGNOSIS — M79.18 MYALGIA, OTHER SITE: ICD-10-CM

## 2024-03-13 DIAGNOSIS — R76.8 OTHER SPECIFIED ABNORMAL IMMUNOLOGICAL FINDINGS IN SERUM: ICD-10-CM

## 2024-03-13 DIAGNOSIS — R79.82 ELEVATED C-REACTIVE PROTEIN (CRP): ICD-10-CM

## 2024-03-13 LAB
25(OH)D3 SERPL-MCNC: 33 NG/ML (ref 30–100)
ALBUMIN SERPL BCP-MCNC: 4.3 G/DL (ref 3.4–5)
ALP SERPL-CCNC: 65 U/L (ref 33–110)
ALT SERPL W P-5'-P-CCNC: 18 U/L (ref 7–45)
ANION GAP SERPL CALC-SCNC: 13 MMOL/L (ref 10–20)
APPEARANCE UR: CLEAR
AST SERPL W P-5'-P-CCNC: 20 U/L (ref 9–39)
BASOPHILS # BLD AUTO: 0.05 X10*3/UL (ref 0–0.1)
BASOPHILS NFR BLD AUTO: 0.7 %
BILIRUB SERPL-MCNC: 0.2 MG/DL (ref 0–1.2)
BILIRUB UR STRIP.AUTO-MCNC: NEGATIVE MG/DL
BUN SERPL-MCNC: 16 MG/DL (ref 6–23)
CALCIUM SERPL-MCNC: 9.8 MG/DL (ref 8.6–10.3)
CHLORIDE SERPL-SCNC: 100 MMOL/L (ref 98–107)
CK SERPL-CCNC: 84 U/L (ref 0–215)
CO2 SERPL-SCNC: 27 MMOL/L (ref 21–32)
COLOR UR: YELLOW
CREAT SERPL-MCNC: 0.86 MG/DL (ref 0.5–1.05)
CRP SERPL-MCNC: 1.62 MG/DL
EGFRCR SERPLBLD CKD-EPI 2021: >90 ML/MIN/1.73M*2
EOSINOPHIL # BLD AUTO: 0.2 X10*3/UL (ref 0–0.7)
EOSINOPHIL NFR BLD AUTO: 2.7 %
ERYTHROCYTE [DISTWIDTH] IN BLOOD BY AUTOMATED COUNT: 12.9 % (ref 11.5–14.5)
ERYTHROCYTE [SEDIMENTATION RATE] IN BLOOD BY WESTERGREN METHOD: 47 MM/H (ref 0–20)
EST. AVERAGE GLUCOSE BLD GHB EST-MCNC: 123 MG/DL
GLUCOSE SERPL-MCNC: 130 MG/DL (ref 74–99)
GLUCOSE UR STRIP.AUTO-MCNC: NEGATIVE MG/DL
HBA1C MFR BLD: 5.9 %
HCT VFR BLD AUTO: 40.4 % (ref 36–46)
HGB BLD-MCNC: 13.6 G/DL (ref 12–16)
IMM GRANULOCYTES # BLD AUTO: 0.03 X10*3/UL (ref 0–0.7)
IMM GRANULOCYTES NFR BLD AUTO: 0.4 % (ref 0–0.9)
KETONES UR STRIP.AUTO-MCNC: NEGATIVE MG/DL
LEUKOCYTE ESTERASE UR QL STRIP.AUTO: NEGATIVE
LYMPHOCYTES # BLD AUTO: 2.04 X10*3/UL (ref 1.2–4.8)
LYMPHOCYTES NFR BLD AUTO: 27.3 %
MCH RBC QN AUTO: 28.4 PG (ref 26–34)
MCHC RBC AUTO-ENTMCNC: 33.7 G/DL (ref 32–36)
MCV RBC AUTO: 84 FL (ref 80–100)
MONOCYTES # BLD AUTO: 0.52 X10*3/UL (ref 0.1–1)
MONOCYTES NFR BLD AUTO: 7 %
NEUTROPHILS # BLD AUTO: 4.63 X10*3/UL (ref 1.2–7.7)
NEUTROPHILS NFR BLD AUTO: 61.9 %
NITRITE UR QL STRIP.AUTO: NEGATIVE
NRBC BLD-RTO: 0 /100 WBCS (ref 0–0)
PH UR STRIP.AUTO: 6 [PH]
PLATELET # BLD AUTO: 429 X10*3/UL (ref 150–450)
POTASSIUM SERPL-SCNC: 3.9 MMOL/L (ref 3.5–5.3)
PROT SERPL-MCNC: 7.3 G/DL (ref 6.4–8.2)
PROT UR STRIP.AUTO-MCNC: NEGATIVE MG/DL
RBC # BLD AUTO: 4.79 X10*6/UL (ref 4–5.2)
RBC # UR STRIP.AUTO: NEGATIVE /UL
SODIUM SERPL-SCNC: 136 MMOL/L (ref 136–145)
SP GR UR STRIP.AUTO: 1.03
UROBILINOGEN UR STRIP.AUTO-MCNC: <2 MG/DL
WBC # BLD AUTO: 7.5 X10*3/UL (ref 4.4–11.3)

## 2024-03-13 PROCEDURE — 85652 RBC SED RATE AUTOMATED: CPT

## 2024-03-13 PROCEDURE — 36415 COLL VENOUS BLD VENIPUNCTURE: CPT

## 2024-03-13 PROCEDURE — 82550 ASSAY OF CK (CPK): CPT

## 2024-03-13 PROCEDURE — 86036 ANCA SCREEN EACH ANTIBODY: CPT

## 2024-03-13 PROCEDURE — 83036 HEMOGLOBIN GLYCOSYLATED A1C: CPT

## 2024-03-13 PROCEDURE — 85025 COMPLETE CBC W/AUTO DIFF WBC: CPT

## 2024-03-13 PROCEDURE — 83516 IMMUNOASSAY NONANTIBODY: CPT

## 2024-03-13 PROCEDURE — 86140 C-REACTIVE PROTEIN: CPT

## 2024-03-13 PROCEDURE — 81003 URINALYSIS AUTO W/O SCOPE: CPT

## 2024-03-13 PROCEDURE — 80053 COMPREHEN METABOLIC PANEL: CPT

## 2024-03-13 PROCEDURE — 82652 VIT D 1 25-DIHYDROXY: CPT

## 2024-03-13 PROCEDURE — 82306 VITAMIN D 25 HYDROXY: CPT

## 2024-03-14 ENCOUNTER — OFFICE VISIT (OUTPATIENT)
Dept: PRIMARY CARE | Facility: CLINIC | Age: 24
End: 2024-03-14
Payer: COMMERCIAL

## 2024-03-14 VITALS
DIASTOLIC BLOOD PRESSURE: 86 MMHG | SYSTOLIC BLOOD PRESSURE: 120 MMHG | OXYGEN SATURATION: 98 % | HEART RATE: 92 BPM | BODY MASS INDEX: 43.19 KG/M2 | WEIGHT: 243.8 LBS

## 2024-03-14 DIAGNOSIS — I15.9 SECONDARY HYPERTENSION: ICD-10-CM

## 2024-03-14 DIAGNOSIS — J30.2 SEASONAL ALLERGIES: ICD-10-CM

## 2024-03-14 DIAGNOSIS — L70.0 ACNE VULGARIS: ICD-10-CM

## 2024-03-14 DIAGNOSIS — R60.0 LOCALIZED EDEMA: ICD-10-CM

## 2024-03-14 DIAGNOSIS — M79.7 FIBROMYALGIA: Primary | ICD-10-CM

## 2024-03-14 DIAGNOSIS — R73.03 PREDIABETES: ICD-10-CM

## 2024-03-14 LAB — 1,25(OH)2D3 SERPL-MCNC: 140 PG/ML (ref 19.9–79.3)

## 2024-03-14 PROCEDURE — 3074F SYST BP LT 130 MM HG: CPT | Performed by: INTERNAL MEDICINE

## 2024-03-14 PROCEDURE — 3079F DIAST BP 80-89 MM HG: CPT | Performed by: INTERNAL MEDICINE

## 2024-03-14 PROCEDURE — 3008F BODY MASS INDEX DOCD: CPT | Performed by: INTERNAL MEDICINE

## 2024-03-14 PROCEDURE — 99214 OFFICE O/P EST MOD 30 MIN: CPT | Performed by: INTERNAL MEDICINE

## 2024-03-14 PROCEDURE — 1036F TOBACCO NON-USER: CPT | Performed by: INTERNAL MEDICINE

## 2024-03-14 RX ORDER — MONTELUKAST SODIUM 10 MG/1
10 TABLET ORAL NIGHTLY
Qty: 30 TABLET | Refills: 5 | Status: SHIPPED | OUTPATIENT
Start: 2024-03-14

## 2024-03-14 RX ORDER — CHLORTHALIDONE 25 MG/1
25 TABLET ORAL DAILY
Qty: 30 TABLET | Refills: 5 | Status: SHIPPED | OUTPATIENT
Start: 2024-03-14 | End: 2024-09-10

## 2024-03-14 RX ORDER — SPIRONOLACTONE 25 MG/1
25 TABLET ORAL DAILY
Qty: 30 TABLET | Refills: 5 | Status: SHIPPED | OUTPATIENT
Start: 2024-03-14 | End: 2024-09-10

## 2024-03-14 RX ORDER — PREGABALIN 200 MG/1
200 CAPSULE ORAL 2 TIMES DAILY
Qty: 180 CAPSULE | Refills: 1 | Status: SHIPPED | OUTPATIENT
Start: 2024-03-14

## 2024-03-14 ASSESSMENT — ENCOUNTER SYMPTOMS
COUGH: 0
CHEST TIGHTNESS: 0
VOMITING: 0
DIARRHEA: 0
ABDOMINAL PAIN: 0
BLOOD IN STOOL: 0
SHORTNESS OF BREATH: 0
NAUSEA: 0
PALPITATIONS: 0
WHEEZING: 0
CONSTIPATION: 1

## 2024-03-14 NOTE — PATIENT INSTRUCTIONS
As we discussed I have provided refills on all of your medications that we prescribed here and please contact me if there is any issues with the pharmacy  Please remember to go get your sleep study completed and once the results are known I will contact you  We went ahead and made a follow-up appointment for 6 months as a routine visit and I did order lab work to be done just prior to that visit

## 2024-03-14 NOTE — ASSESSMENT & PLAN NOTE
-Her hemoglobin A1c went up slightly at 5.9  -We have discussed the notion of a prediabetic state and ways to robledo off diabetes in the future  We will check another hemoglobin A1c in 6 months-

## 2024-03-14 NOTE — PROGRESS NOTES
"Subjective   Patient ID: Mable Carrillo \"Rafael" is a 23 y.o. female who presents for No chief complaint on file..  HPI  She is here today for follow-up and accompanied by her significant other.  Since her last visit she has been taking the chlorthalidone and they have been checking the blood pressures at home.  They have been getting much better readings and on today's evaluation her blood pressure is excellent.  She also appears to be tolerating the medication well.  We are providing refills on several medications today.  She is also following with Dr. Hhan for her inflammatory condition and she recently had lab work.  We went over the results of all of those labs.  We also discussed her condition of chronic fibromyalgia.  She has been taking Lyrica for some time which has been effective in controlling her symptoms.  Because it is a controlled substance that checked [place] which is an order and we had her sign a new controlled substance agreement contract.  I will give her enough refills for 6 months.  In fact we decided that she could safely return in 6 months for follow-up and certainly sooner if any problems.  We also discussed her sleep study and it was scheduled but she states she has to reschedule it because she felt like she had nasal congestion and did not wanted to skew the results.  She states she will reschedule and once results come back I will contact her.  Review of Systems   Respiratory:  Negative for cough, chest tightness, shortness of breath and wheezing.         Random feelings of SOB since COVID in 2022   Cardiovascular:  Negative for chest pain, palpitations and leg swelling.   Gastrointestinal:  Positive for constipation. Negative for abdominal pain, blood in stool, diarrhea, nausea and vomiting.     Objective   Physical Exam  Vitals and nursing note reviewed.   Constitutional:       General: She is not in acute distress.     Appearance: Normal appearance.   HENT:      Head: " Normocephalic and atraumatic.   Eyes:      Conjunctiva/sclera: Conjunctivae normal.   Cardiovascular:      Rate and Rhythm: Normal rate and regular rhythm.      Heart sounds: Normal heart sounds.   Pulmonary:      Effort: No respiratory distress.      Breath sounds: No wheezing.   Abdominal:      Palpations: Abdomen is soft.      Tenderness: There is no abdominal tenderness. There is no guarding.   Musculoskeletal:         General: No swelling. Normal range of motion.   Skin:     General: Skin is warm and dry.   Neurological:      General: No focal deficit present.      Mental Status: She is alert and oriented to person, place, and time.   Psychiatric:         Behavior: Behavior normal.       Recent Results (from the past 672 hour(s))   Hemoglobin A1C    Collection Time: 03/13/24 12:03 PM   Result Value Ref Range    Hemoglobin A1C 5.9 (H) see below %    Estimated Average Glucose 123 Not Established mg/dL   Comprehensive Metabolic Panel    Collection Time: 03/13/24 12:03 PM   Result Value Ref Range    Glucose 130 (H) 74 - 99 mg/dL    Sodium 136 136 - 145 mmol/L    Potassium 3.9 3.5 - 5.3 mmol/L    Chloride 100 98 - 107 mmol/L    Bicarbonate 27 21 - 32 mmol/L    Anion Gap 13 10 - 20 mmol/L    Urea Nitrogen 16 6 - 23 mg/dL    Creatinine 0.86 0.50 - 1.05 mg/dL    eGFR >90 >60 mL/min/1.73m*2    Calcium 9.8 8.6 - 10.3 mg/dL    Albumin 4.3 3.4 - 5.0 g/dL    Alkaline Phosphatase 65 33 - 110 U/L    Total Protein 7.3 6.4 - 8.2 g/dL    AST 20 9 - 39 U/L    Bilirubin, Total 0.2 0.0 - 1.2 mg/dL    ALT 18 7 - 45 U/L   Sedimentation Rate    Collection Time: 03/13/24 12:03 PM   Result Value Ref Range    Sedimentation Rate 47 (H) 0 - 20 mm/h   C-Reactive Protein    Collection Time: 03/13/24 12:03 PM   Result Value Ref Range    C-Reactive Protein 1.62 (H) <1.00 mg/dL   Creatine Kinase    Collection Time: 03/13/24 12:03 PM   Result Value Ref Range    Creatine Kinase 84 0 - 215 U/L   CBC and Auto Differential    Collection Time:  03/13/24 12:04 PM   Result Value Ref Range    WBC 7.5 4.4 - 11.3 x10*3/uL    nRBC 0.0 0.0 - 0.0 /100 WBCs    RBC 4.79 4.00 - 5.20 x10*6/uL    Hemoglobin 13.6 12.0 - 16.0 g/dL    Hematocrit 40.4 36.0 - 46.0 %    MCV 84 80 - 100 fL    MCH 28.4 26.0 - 34.0 pg    MCHC 33.7 32.0 - 36.0 g/dL    RDW 12.9 11.5 - 14.5 %    Platelets 429 150 - 450 x10*3/uL    Neutrophils % 61.9 40.0 - 80.0 %    Immature Granulocytes %, Automated 0.4 0.0 - 0.9 %    Lymphocytes % 27.3 13.0 - 44.0 %    Monocytes % 7.0 2.0 - 10.0 %    Eosinophils % 2.7 0.0 - 6.0 %    Basophils % 0.7 0.0 - 2.0 %    Neutrophils Absolute 4.63 1.20 - 7.70 x10*3/uL    Immature Granulocytes Absolute, Automated 0.03 0.00 - 0.70 x10*3/uL    Lymphocytes Absolute 2.04 1.20 - 4.80 x10*3/uL    Monocytes Absolute 0.52 0.10 - 1.00 x10*3/uL    Eosinophils Absolute 0.20 0.00 - 0.70 x10*3/uL    Basophils Absolute 0.05 0.00 - 0.10 x10*3/uL   Vitamin D 25-Hydroxy,Total (for eval of Vitamin D levels)    Collection Time: 03/13/24 12:04 PM   Result Value Ref Range    Vitamin D, 25-Hydroxy, Total 33 30 - 100 ng/mL   Urinalysis with Reflex Microscopic    Collection Time: 03/13/24 12:10 PM   Result Value Ref Range    Color, Urine Yellow Straw, Yellow    Appearance, Urine Clear Clear    Specific Gravity, Urine 1.029 1.005 - 1.035    pH, Urine 6.0 5.0, 5.5, 6.0, 6.5, 7.0, 7.5, 8.0    Protein, Urine NEGATIVE NEGATIVE mg/dL    Glucose, Urine NEGATIVE NEGATIVE mg/dL    Blood, Urine NEGATIVE NEGATIVE    Ketones, Urine NEGATIVE NEGATIVE mg/dL    Bilirubin, Urine NEGATIVE NEGATIVE    Urobilinogen, Urine <2.0 <2.0 mg/dL    Nitrite, Urine NEGATIVE NEGATIVE    Leukocyte Esterase, Urine NEGATIVE NEGATIVE       Assessment/Plan   Problem List Items Addressed This Visit             ICD-10-CM    Localized edema R60.0     -Currently adequately controlled         Relevant Medications    spironolactone (Aldactone) 25 mg tablet    Fibromyalgia - Primary M79.7     -She has renewed her controlled substance  agreement contract  -We are providing refills for 6 months worth  -I have checked [place]         Prediabetes R73.03     -Her hemoglobin A1c went up slightly at 5.9  -We have discussed the notion of a prediabetic state and ways to robledo off diabetes in the future  We will check another hemoglobin A1c in 6 months-         Relevant Orders    Hemoglobin A1C    Secondary hypertension I15.9     -Currently under very good control  -We are providing refills on medications today  -We will have her return in approximately 6 months for reevaluation         Relevant Medications    chlorthalidone (Hygroton) 25 mg tablet    Other Relevant Orders    Basic Metabolic Panel     Other Visit Diagnoses         Codes    Acne vulgaris     L70.0    Relevant Medications    spironolactone (Aldactone) 25 mg tablet    Seasonal allergies     J30.2    Relevant Medications    montelukast (Singulair) 10 mg tablet               Angela Staton DO

## 2024-03-14 NOTE — ASSESSMENT & PLAN NOTE
-She has renewed her controlled substance agreement contract  -We are providing refills for 6 months worth  -I have checked [place]

## 2024-03-14 NOTE — ASSESSMENT & PLAN NOTE
-Currently under very good control  -We are providing refills on medications today  -We will have her return in approximately 6 months for reevaluation

## 2024-03-16 LAB
ANCA AB PATTERN SER IF-IMP: ABNORMAL
ANCA IGG TITR SER IF: ABNORMAL {TITER}
MYELOPEROXIDASE AB SER-ACNC: 1 AU/ML (ref 0–19)
PROTEINASE3 AB SER-ACNC: 176 AU/ML (ref 0–19)

## 2024-04-05 ENCOUNTER — APPOINTMENT (OUTPATIENT)
Dept: RADIOLOGY | Facility: HOSPITAL | Age: 24
End: 2024-04-05
Payer: COMMERCIAL

## 2024-04-05 ENCOUNTER — HOSPITAL ENCOUNTER (OUTPATIENT)
Dept: CARDIOLOGY | Facility: HOSPITAL | Age: 24
Discharge: HOME | End: 2024-04-05
Payer: COMMERCIAL

## 2024-04-05 ENCOUNTER — HOSPITAL ENCOUNTER (EMERGENCY)
Facility: HOSPITAL | Age: 24
Discharge: HOME | End: 2024-04-05
Payer: COMMERCIAL

## 2024-04-05 VITALS
WEIGHT: 236 LBS | SYSTOLIC BLOOD PRESSURE: 114 MMHG | BODY MASS INDEX: 41.82 KG/M2 | OXYGEN SATURATION: 98 % | HEART RATE: 80 BPM | DIASTOLIC BLOOD PRESSURE: 68 MMHG | TEMPERATURE: 97.7 F | HEIGHT: 63 IN | RESPIRATION RATE: 18 BRPM

## 2024-04-05 DIAGNOSIS — R06.00 DYSPNEA, UNSPECIFIED TYPE: ICD-10-CM

## 2024-04-05 DIAGNOSIS — E87.6 HYPOKALEMIA: Primary | ICD-10-CM

## 2024-04-05 LAB
ALBUMIN SERPL BCP-MCNC: 4.5 G/DL (ref 3.4–5)
ALP SERPL-CCNC: 81 U/L (ref 33–110)
ALT SERPL W P-5'-P-CCNC: 20 U/L (ref 7–45)
ANION GAP SERPL CALC-SCNC: 15 MMOL/L (ref 10–20)
APPEARANCE UR: CLEAR
AST SERPL W P-5'-P-CCNC: 18 U/L (ref 9–39)
BASOPHILS # BLD AUTO: 0.08 X10*3/UL (ref 0–0.1)
BASOPHILS NFR BLD AUTO: 0.7 %
BILIRUB SERPL-MCNC: 0.3 MG/DL (ref 0–1.2)
BILIRUB UR STRIP.AUTO-MCNC: NEGATIVE MG/DL
BNP SERPL-MCNC: 8 PG/ML (ref 0–99)
BUN SERPL-MCNC: 16 MG/DL (ref 6–23)
CALCIUM SERPL-MCNC: 9.6 MG/DL (ref 8.6–10.3)
CARDIAC TROPONIN I PNL SERPL HS: 4 NG/L (ref 0–13)
CARDIAC TROPONIN I PNL SERPL HS: 5 NG/L (ref 0–13)
CHLORIDE SERPL-SCNC: 96 MMOL/L (ref 98–107)
CO2 SERPL-SCNC: 28 MMOL/L (ref 21–32)
COLOR UR: YELLOW
CREAT SERPL-MCNC: 0.79 MG/DL (ref 0.5–1.05)
D DIMER PPP FEU-MCNC: 364 NG/ML FEU
EGFRCR SERPLBLD CKD-EPI 2021: >90 ML/MIN/1.73M*2
EOSINOPHIL # BLD AUTO: 0.02 X10*3/UL (ref 0–0.7)
EOSINOPHIL NFR BLD AUTO: 0.2 %
ERYTHROCYTE [DISTWIDTH] IN BLOOD BY AUTOMATED COUNT: 12.9 % (ref 11.5–14.5)
FLUAV RNA RESP QL NAA+PROBE: NOT DETECTED
FLUBV RNA RESP QL NAA+PROBE: NOT DETECTED
GLUCOSE SERPL-MCNC: 98 MG/DL (ref 74–99)
GLUCOSE UR STRIP.AUTO-MCNC: NEGATIVE MG/DL
HCG UR QL IA.RAPID: NEGATIVE
HCT VFR BLD AUTO: 44.5 % (ref 36–46)
HGB BLD-MCNC: 14.9 G/DL (ref 12–16)
IMM GRANULOCYTES # BLD AUTO: 0.03 X10*3/UL (ref 0–0.7)
IMM GRANULOCYTES NFR BLD AUTO: 0.3 % (ref 0–0.9)
KETONES UR STRIP.AUTO-MCNC: NEGATIVE MG/DL
LEUKOCYTE ESTERASE UR QL STRIP.AUTO: NEGATIVE
LYMPHOCYTES # BLD AUTO: 2.92 X10*3/UL (ref 1.2–4.8)
LYMPHOCYTES NFR BLD AUTO: 24.5 %
MAGNESIUM SERPL-MCNC: 1.9 MG/DL (ref 1.6–2.4)
MCH RBC QN AUTO: 28.1 PG (ref 26–34)
MCHC RBC AUTO-ENTMCNC: 33.5 G/DL (ref 32–36)
MCV RBC AUTO: 84 FL (ref 80–100)
MONOCYTES # BLD AUTO: 1.01 X10*3/UL (ref 0.1–1)
MONOCYTES NFR BLD AUTO: 8.5 %
MUCOUS THREADS #/AREA URNS AUTO: NORMAL /LPF
NEUTROPHILS # BLD AUTO: 7.87 X10*3/UL (ref 1.2–7.7)
NEUTROPHILS NFR BLD AUTO: 65.8 %
NITRITE UR QL STRIP.AUTO: NEGATIVE
NRBC BLD-RTO: 0 /100 WBCS (ref 0–0)
PH UR STRIP.AUTO: 6 [PH]
PLATELET # BLD AUTO: 461 X10*3/UL (ref 150–450)
POTASSIUM SERPL-SCNC: 3 MMOL/L (ref 3.5–5.3)
PROT SERPL-MCNC: 7.9 G/DL (ref 6.4–8.2)
PROT UR STRIP.AUTO-MCNC: ABNORMAL MG/DL
RBC # BLD AUTO: 5.31 X10*6/UL (ref 4–5.2)
RBC # UR STRIP.AUTO: NEGATIVE /UL
RBC #/AREA URNS AUTO: NORMAL /HPF
SARS-COV-2 RNA RESP QL NAA+PROBE: NOT DETECTED
SODIUM SERPL-SCNC: 136 MMOL/L (ref 136–145)
SP GR UR STRIP.AUTO: 1.02
SQUAMOUS #/AREA URNS AUTO: NORMAL /HPF
UROBILINOGEN UR STRIP.AUTO-MCNC: <2 MG/DL
WBC # BLD AUTO: 11.9 X10*3/UL (ref 4.4–11.3)
WBC #/AREA URNS AUTO: NORMAL /HPF

## 2024-04-05 PROCEDURE — 85025 COMPLETE CBC W/AUTO DIFF WBC: CPT | Performed by: NURSE PRACTITIONER

## 2024-04-05 PROCEDURE — 36415 COLL VENOUS BLD VENIPUNCTURE: CPT | Performed by: NURSE PRACTITIONER

## 2024-04-05 PROCEDURE — 85379 FIBRIN DEGRADATION QUANT: CPT | Performed by: NURSE PRACTITIONER

## 2024-04-05 PROCEDURE — 81001 URINALYSIS AUTO W/SCOPE: CPT | Performed by: NURSE PRACTITIONER

## 2024-04-05 PROCEDURE — 81025 URINE PREGNANCY TEST: CPT | Performed by: NURSE PRACTITIONER

## 2024-04-05 PROCEDURE — 80053 COMPREHEN METABOLIC PANEL: CPT | Performed by: NURSE PRACTITIONER

## 2024-04-05 PROCEDURE — 71046 X-RAY EXAM CHEST 2 VIEWS: CPT

## 2024-04-05 PROCEDURE — 96365 THER/PROPH/DIAG IV INF INIT: CPT

## 2024-04-05 PROCEDURE — 99284 EMERGENCY DEPT VISIT MOD MDM: CPT

## 2024-04-05 PROCEDURE — 83735 ASSAY OF MAGNESIUM: CPT | Performed by: NURSE PRACTITIONER

## 2024-04-05 PROCEDURE — 87636 SARSCOV2 & INF A&B AMP PRB: CPT | Performed by: NURSE PRACTITIONER

## 2024-04-05 PROCEDURE — 83880 ASSAY OF NATRIURETIC PEPTIDE: CPT | Performed by: NURSE PRACTITIONER

## 2024-04-05 PROCEDURE — 93005 ELECTROCARDIOGRAM TRACING: CPT

## 2024-04-05 PROCEDURE — 2500000002 HC RX 250 W HCPCS SELF ADMINISTERED DRUGS (ALT 637 FOR MEDICARE OP, ALT 636 FOR OP/ED): Performed by: NURSE PRACTITIONER

## 2024-04-05 PROCEDURE — 84484 ASSAY OF TROPONIN QUANT: CPT | Performed by: NURSE PRACTITIONER

## 2024-04-05 PROCEDURE — 2500000004 HC RX 250 GENERAL PHARMACY W/ HCPCS (ALT 636 FOR OP/ED): Performed by: NURSE PRACTITIONER

## 2024-04-05 PROCEDURE — 71046 X-RAY EXAM CHEST 2 VIEWS: CPT | Performed by: STUDENT IN AN ORGANIZED HEALTH CARE EDUCATION/TRAINING PROGRAM

## 2024-04-05 PROCEDURE — 96366 THER/PROPH/DIAG IV INF ADDON: CPT

## 2024-04-05 RX ORDER — POTASSIUM CHLORIDE 20 MEQ/1
40 TABLET, EXTENDED RELEASE ORAL ONCE
Status: COMPLETED | OUTPATIENT
Start: 2024-04-05 | End: 2024-04-05

## 2024-04-05 RX ORDER — POTASSIUM CHLORIDE 20 MEQ/1
20 TABLET, EXTENDED RELEASE ORAL 2 TIMES DAILY
Qty: 10 TABLET | Refills: 0 | Status: SHIPPED | OUTPATIENT
Start: 2024-04-05 | End: 2024-04-11 | Stop reason: SDUPTHER

## 2024-04-05 RX ORDER — POTASSIUM CHLORIDE 14.9 MG/ML
20 INJECTION INTRAVENOUS ONCE
Status: COMPLETED | OUTPATIENT
Start: 2024-04-05 | End: 2024-04-05

## 2024-04-05 RX ADMIN — SODIUM CHLORIDE 1000 ML: 9 INJECTION, SOLUTION INTRAVENOUS at 19:15

## 2024-04-05 RX ADMIN — POTASSIUM CHLORIDE 40 MEQ: 1500 TABLET, EXTENDED RELEASE ORAL at 19:16

## 2024-04-05 RX ADMIN — POTASSIUM CHLORIDE 20 MEQ: 14.9 INJECTION, SOLUTION INTRAVENOUS at 19:16

## 2024-04-05 ASSESSMENT — PAIN DESCRIPTION - LOCATION: LOCATION: RIB CAGE

## 2024-04-05 ASSESSMENT — PAIN DESCRIPTION - ORIENTATION: ORIENTATION: RIGHT;LEFT

## 2024-04-05 ASSESSMENT — PAIN - FUNCTIONAL ASSESSMENT: PAIN_FUNCTIONAL_ASSESSMENT: 0-10

## 2024-04-05 ASSESSMENT — COLUMBIA-SUICIDE SEVERITY RATING SCALE - C-SSRS
1. IN THE PAST MONTH, HAVE YOU WISHED YOU WERE DEAD OR WISHED YOU COULD GO TO SLEEP AND NOT WAKE UP?: NO
2. HAVE YOU ACTUALLY HAD ANY THOUGHTS OF KILLING YOURSELF?: NO
6. HAVE YOU EVER DONE ANYTHING, STARTED TO DO ANYTHING, OR PREPARED TO DO ANYTHING TO END YOUR LIFE?: NO

## 2024-04-05 ASSESSMENT — PAIN DESCRIPTION - DESCRIPTORS: DESCRIPTORS: CRAMPING

## 2024-04-05 ASSESSMENT — PAIN DESCRIPTION - PAIN TYPE: TYPE: CHRONIC PAIN

## 2024-04-05 ASSESSMENT — PAIN SCALES - GENERAL: PAINLEVEL_OUTOF10: 4
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
## 2024-04-06 LAB — HOLD SPECIMEN: NORMAL

## 2024-04-07 LAB
ATRIAL RATE: 114 BPM
P AXIS: 62 DEGREES
P OFFSET: 199 MS
P ONSET: 150 MS
PR INTERVAL: 134 MS
Q ONSET: 217 MS
QRS COUNT: 19 BEATS
QRS DURATION: 84 MS
QT INTERVAL: 336 MS
QTC CALCULATION(BAZETT): 463 MS
QTC FREDERICIA: 416 MS
R AXIS: 62 DEGREES
T AXIS: -57 DEGREES
T OFFSET: 385 MS
VENTRICULAR RATE: 114 BPM

## 2024-04-08 ENCOUNTER — TELEPHONE (OUTPATIENT)
Dept: PRIMARY CARE | Facility: CLINIC | Age: 24
End: 2024-04-08
Payer: COMMERCIAL

## 2024-04-08 NOTE — TELEPHONE ENCOUNTER
It looks like she had a recent visit to the emergency department and received therapy.  Please see how she is doing now and does she have a follow-up appointment?  Thank you

## 2024-04-08 NOTE — TELEPHONE ENCOUNTER
Patient called and left . She recently had an appt at [facility] with Dr. Rohit Timmons. She said she had sever abnormal labs. And she wondered if you could look them over and see if her medications need changed or adjusted. She said she is on two diuretics. I updated her Care Everywhere, so they are available.

## 2024-04-09 ENCOUNTER — CLINICAL SUPPORT (OUTPATIENT)
Dept: SLEEP MEDICINE | Facility: HOSPITAL | Age: 24
End: 2024-04-09
Payer: COMMERCIAL

## 2024-04-09 ENCOUNTER — OFFICE VISIT (OUTPATIENT)
Dept: PRIMARY CARE | Facility: CLINIC | Age: 24
End: 2024-04-09
Payer: COMMERCIAL

## 2024-04-09 VITALS
DIASTOLIC BLOOD PRESSURE: 88 MMHG | SYSTOLIC BLOOD PRESSURE: 122 MMHG | HEIGHT: 63 IN | OXYGEN SATURATION: 98 % | WEIGHT: 234 LBS | BODY MASS INDEX: 41.46 KG/M2 | HEART RATE: 120 BPM

## 2024-04-09 VITALS — WEIGHT: 235.89 LBS | BODY MASS INDEX: 41.8 KG/M2 | HEIGHT: 63 IN

## 2024-04-09 DIAGNOSIS — M79.7 FIBROMYALGIA: ICD-10-CM

## 2024-04-09 DIAGNOSIS — E87.6 HYPOKALEMIA: Primary | ICD-10-CM

## 2024-04-09 DIAGNOSIS — G47.33 OBSTRUCTIVE SLEEP APNEA SYNDROME: ICD-10-CM

## 2024-04-09 DIAGNOSIS — R94.31 ABNORMAL EKG: ICD-10-CM

## 2024-04-09 DIAGNOSIS — I77.6 VASCULITIS (CMS-HCC): ICD-10-CM

## 2024-04-09 PROCEDURE — 99213 OFFICE O/P EST LOW 20 MIN: CPT | Performed by: INTERNAL MEDICINE

## 2024-04-09 PROCEDURE — 3074F SYST BP LT 130 MM HG: CPT | Performed by: INTERNAL MEDICINE

## 2024-04-09 PROCEDURE — 3079F DIAST BP 80-89 MM HG: CPT | Performed by: INTERNAL MEDICINE

## 2024-04-09 PROCEDURE — 1036F TOBACCO NON-USER: CPT | Performed by: INTERNAL MEDICINE

## 2024-04-09 PROCEDURE — 95806 SLEEP STUDY UNATT&RESP EFFT: CPT | Performed by: PSYCHIATRY & NEUROLOGY

## 2024-04-09 PROCEDURE — 9420000001 HC RT PATIENT EDUCATION 5 MIN

## 2024-04-09 RX ORDER — METHOCARBAMOL 500 MG/1
500 TABLET, FILM COATED ORAL 4 TIMES DAILY PRN
Qty: 40 TABLET | Refills: 0 | Status: SHIPPED | OUTPATIENT
Start: 2024-04-09 | End: 2024-06-08

## 2024-04-09 NOTE — ASSESSMENT & PLAN NOTE
-She is currently taking potassium chloride 20 mEq twice daily which was started on Saturday because of low potassium in the emergency department.  Her level was 3.  -She will go tomorrow for a BMP and have agreed to contact her with results.  I am sure that she will need to take some form of potassium supplementation because of the chlorthalidone 25 mg daily  -She will continue with the spironolactone 25 mg daily as well

## 2024-04-09 NOTE — ASSESSMENT & PLAN NOTE
-EKG was performed while she was in the emergency department on April 5  -I am ordering an echocardiogram as a follow-up and have agreed to contact her with results

## 2024-04-09 NOTE — ASSESSMENT & PLAN NOTE
-She has started seeing Dr. Eli Timmons for a second opinion regarding the possibility of a vasculitic condition and she will complete her workup in the near future  -She went off of her methotrexate and prednisone therapy for the time being

## 2024-04-09 NOTE — PATIENT INSTRUCTIONS
As we discussed I sent a refill for your Robaxin and please use it on a sparing basis.  We also advised not to drive or operate heavy machinery while taking this medication  I would like for you to go to the lab tomorrow to repeat your potassium level and once the results are known I will contact you.  You will most likely need to continue taking your potassium but probably 1 tablet daily as opposed to 2  Because of your abnormal EKG am asking the staff to schedule an echocardiogram and once the results are known I will contact you

## 2024-04-09 NOTE — PROGRESS NOTES
[facility] TECH NOTE:     Patient: Mable Carrillo   MRN//AGE: [id number]  2000  23 y.o.   Technologist: Faiza Griffith RRT   Room: Home Sleep Study Nomad   Service Date: 2024        Sleep Testing Location: [place] [address]      Strawberry: 9    TECHNOLOGIST SLEEP STUDY PROCEDURE NOTE:   This sleep study is being conducted according to the policies and procedures outlined by the AAS accreditation standards.  The sleep study procedure and processes involved during this appointment was explained to the patient/patient’s family, questions were answered. The patient/family verbalized understanding.      The patient is a 23 y.o. year old female scheduled for a Home Sleep Apnea Test.      The full study Was completed.  Patient questionnaires completed?: yes     Consents signed? yes    Initial Fall Risk Screening:     Mable has not fallen in the last 6 months. her did not result in injury. Mable does not have a fear of falling. He does not need assistance with sitting, standing, or walking. she does not need assistance walking in her home. she does not need assistance in an unfamiliar setting. The patient is notusing an assistive device.     The Patient received equipment and instructions for use of the [employer] HSAT device. The patient was instructed how to apply the effort belt, cannula, thermistor. It was explained how the Nomad and oximeter components work. Patient was informed of their responsibility for the device and acknowledged this by signing the HSAT device contract. The patient was asked to return the device on the next day and shown where to return.     Brief Study observations:        Deviation to order/protocol and reason:       If PAP, which was preferred mask/pressure/mode:   Other:    After the procedure, the patient/family was informed to ensure followup with ordering clinician for testing results.      Technologist: Faiza Griffith RRT

## 2024-04-09 NOTE — PROGRESS NOTES
"Subjective   Patient ID: Mable Carrillo \"Jaqueline\" is a 23 y.o. female who presents for Follow-up.  HPI  She is here today for follow-up after a recent visit to the emergency department.  She actually went on the fifth.  She had just completed a consultation at the [facility] for her second opinion regarding her rheumatologic disorder.  She saw Dr. Eli Timmons.  In the emergency department they did several tests including lab work which showed a mild reduced potassium level at 3.0.  She also had a reported abnormal EKG indicating \"inferior infarct consider lateral ischemia \".  She was successfully discharged from the emergency department and is here for follow-up.  We talked about her evaluation and her symptoms.  She has been requested to stop taking her methotrexate and prednisone and she is having additional lab work performed.  She is also had a CT scan ordered of her chest.  We talked about her medications and how her chlorthalidone can cause low potassium.  She is taking spironolactone but obviously it was not enough to keep potassium stores at an appropriate level.  She is now on Klor-Con 20 mill equivalents twice daily and I will have her go tomorrow to the lab to recheck her potassium level.  We will decide from there what she needs in the way of continue potassium supplementation.  As far as her EKG is concerned I am ordering an echocardiogram and I will contact her with the results once they are known.  She has had prior cardiac workups including stress test and echocardiogram.  She has requested a refill on Robaxin for her fibromyalgia and she does use it on a sparing basis.  Objective   Physical Exam  Vitals and nursing note reviewed.   Constitutional:       General: She is not in acute distress.     Appearance: Normal appearance.   HENT:      Head: Normocephalic and atraumatic.   Eyes:      Conjunctiva/sclera: Conjunctivae normal.   Cardiovascular:      Rate and Rhythm: Normal " rate and regular rhythm.      Heart sounds: Normal heart sounds.   Pulmonary:      Effort: No respiratory distress.      Breath sounds: No wheezing.   Abdominal:      Palpations: Abdomen is soft.      Tenderness: There is no abdominal tenderness. There is no guarding.   Musculoskeletal:         General: No swelling. Normal range of motion.   Skin:     General: Skin is warm and dry.   Neurological:      General: No focal deficit present.      Mental Status: She is alert and oriented to person, place, and time.   Psychiatric:         Behavior: Behavior normal.       Assessment/Plan   Problem List Items Addressed This Visit             ICD-10-CM    Fibromyalgia M79.7     -I am giving her a refill on her Robaxin and she will call if she has any problems         Relevant Medications    methocarbamol (Robaxin) 500 mg tablet    Vasculitis (CMS/HCC) I77.6     -She has started seeing Dr. Eli Timmons for a second opinion regarding the possibility of a vasculitic condition and she will complete her workup in the near future  -She went off of her methotrexate and prednisone therapy for the time being         Hypokalemia - Primary E87.6     -She is currently taking potassium chloride 20 mEq twice daily which was started on Saturday because of low potassium in the emergency department.  Her level was 3.  -She will go tomorrow for a BMP and have agreed to contact her with results.  I am sure that she will need to take some form of potassium supplementation because of the chlorthalidone 25 mg daily  -She will continue with the spironolactone 25 mg daily as well         Relevant Orders    Basic Metabolic Panel    Abnormal EKG R94.31     -EKG was performed while she was in the emergency department on April 5  -I am ordering an echocardiogram as a follow-up and have agreed to contact her with results         Relevant Orders    Transthoracic Echo (TTE) Complete          Angela Staton DO

## 2024-04-11 ENCOUNTER — LAB (OUTPATIENT)
Dept: LAB | Facility: LAB | Age: 24
End: 2024-04-11
Payer: COMMERCIAL

## 2024-04-11 DIAGNOSIS — E87.6 HYPOKALEMIA: ICD-10-CM

## 2024-04-11 LAB
ANION GAP SERPL CALC-SCNC: 16 MMOL/L (ref 10–20)
BUN SERPL-MCNC: 13 MG/DL (ref 6–23)
CALCIUM SERPL-MCNC: 10 MG/DL (ref 8.6–10.3)
CHLORIDE SERPL-SCNC: 98 MMOL/L (ref 98–107)
CO2 SERPL-SCNC: 26 MMOL/L (ref 21–32)
CREAT SERPL-MCNC: 0.76 MG/DL (ref 0.5–1.05)
EGFRCR SERPLBLD CKD-EPI 2021: >90 ML/MIN/1.73M*2
GLUCOSE SERPL-MCNC: 77 MG/DL (ref 74–99)
POTASSIUM SERPL-SCNC: 3.7 MMOL/L (ref 3.5–5.3)
SODIUM SERPL-SCNC: 136 MMOL/L (ref 136–145)

## 2024-04-11 PROCEDURE — 36415 COLL VENOUS BLD VENIPUNCTURE: CPT

## 2024-04-11 PROCEDURE — 80048 BASIC METABOLIC PNL TOTAL CA: CPT

## 2024-04-11 RX ORDER — POTASSIUM CHLORIDE 20 MEQ/1
20 TABLET, EXTENDED RELEASE ORAL 2 TIMES DAILY
Qty: 60 TABLET | Refills: 2 | Status: SHIPPED | OUTPATIENT
Start: 2024-04-11 | End: 2024-04-12 | Stop reason: SDUPTHER

## 2024-04-12 ENCOUNTER — TELEPHONE (OUTPATIENT)
Dept: PRIMARY CARE | Facility: CLINIC | Age: 24
End: 2024-04-12
Payer: COMMERCIAL

## 2024-04-12 DIAGNOSIS — E87.6 HYPOKALEMIA: ICD-10-CM

## 2024-04-12 RX ORDER — POTASSIUM CHLORIDE 20 MEQ/1
20 TABLET, EXTENDED RELEASE ORAL 2 TIMES DAILY
Qty: 60 TABLET | Refills: 2 | Status: SHIPPED | OUTPATIENT
Start: 2024-04-12 | End: 2024-07-11

## 2024-04-12 NOTE — TELEPHONE ENCOUNTER
[facility] called for clarification on the patients potassium. They stated the order was written 1 tablet BID x5 days. But a quanity of 60 and 1 refill. They just wanted to make sure that was correct.

## 2024-04-18 NOTE — RESULT ENCOUNTER NOTE
Could you please print up her home sleep apnea test and attach the written prescription for CPAP therapy.  I spoke to her this evening and she is going to stop by tomorrow to  the prescription and then I need to see her back in follow-up in approximately 4 weeks in the office.  Thank you!

## 2024-04-27 ENCOUNTER — HOSPITAL ENCOUNTER (OUTPATIENT)
Dept: RADIOLOGY | Facility: HOSPITAL | Age: 24
Discharge: HOME | End: 2024-04-27
Payer: COMMERCIAL

## 2024-04-27 DIAGNOSIS — R04.0 EPISTAXIS: ICD-10-CM

## 2024-04-27 DIAGNOSIS — R06.02 SHORTNESS OF BREATH: ICD-10-CM

## 2024-04-27 DIAGNOSIS — J34.89 OTHER SPECIFIED DISORDERS OF NOSE AND NASAL SINUSES: ICD-10-CM

## 2024-04-27 PROCEDURE — 71250 CT THORAX DX C-: CPT | Performed by: RADIOLOGY

## 2024-04-27 PROCEDURE — 71250 CT THORAX DX C-: CPT

## 2024-04-29 ENCOUNTER — HOSPITAL ENCOUNTER (OUTPATIENT)
Dept: CARDIOLOGY | Facility: HOSPITAL | Age: 24
Discharge: HOME | End: 2024-04-29
Payer: COMMERCIAL

## 2024-04-29 DIAGNOSIS — R94.31 ABNORMAL EKG: ICD-10-CM

## 2024-04-29 DIAGNOSIS — R94.31 ABNORMAL EKG: Primary | ICD-10-CM

## 2024-04-29 LAB
AORTIC VALVE MEAN GRADIENT: 5 MMHG
AORTIC VALVE PEAK VELOCITY: 1.58 M/S
AV PEAK GRADIENT: 10 MMHG
AVA (PEAK VEL): 1.91 CM2
AVA (VTI): 1.68 CM2
EJECTION FRACTION APICAL 4 CHAMBER: 61.5
LEFT ATRIUM VOLUME AREA LENGTH INDEX BSA: 9.4 ML/M2
LEFT VENTRICLE INTERNAL DIMENSION DIASTOLE: 3.58 CM (ref 3.5–6)
LEFT VENTRICULAR OUTFLOW TRACT DIAMETER: 1.7 CM
LV EJECTION FRACTION BIPLANE: 57 %
TRICUSPID ANNULAR PLANE SYSTOLIC EXCURSION: 1.9 CM

## 2024-04-29 PROCEDURE — 93306 TTE W/DOPPLER COMPLETE: CPT

## 2024-04-29 PROCEDURE — 93306 TTE W/DOPPLER COMPLETE: CPT | Performed by: STUDENT IN AN ORGANIZED HEALTH CARE EDUCATION/TRAINING PROGRAM

## 2024-04-29 NOTE — RESULT ENCOUNTER NOTE
I called her this evening about her echocardiogram to explain that it looked okay but because her EKG was interpreted as possible ischemia and significant abnormalities I would like for her to see cardiology and she is agreeable.  I did place a referral and she needs to be scheduled.  Thank you!!

## 2024-05-22 ENCOUNTER — OFFICE VISIT (OUTPATIENT)
Dept: PRIMARY CARE | Facility: CLINIC | Age: 24
End: 2024-05-22
Payer: COMMERCIAL

## 2024-05-22 VITALS
DIASTOLIC BLOOD PRESSURE: 80 MMHG | BODY MASS INDEX: 42.56 KG/M2 | SYSTOLIC BLOOD PRESSURE: 120 MMHG | HEART RATE: 113 BPM | HEIGHT: 63 IN | WEIGHT: 240.19 LBS

## 2024-05-22 DIAGNOSIS — R91.8 ABNORMAL CT SCAN OF LUNG: Primary | ICD-10-CM

## 2024-05-22 DIAGNOSIS — G47.33 OBSTRUCTIVE SLEEP APNEA SYNDROME: ICD-10-CM

## 2024-05-22 DIAGNOSIS — K76.0 METABOLIC DYSFUNCTION-ASSOCIATED STEATOTIC LIVER DISEASE (MASLD): ICD-10-CM

## 2024-05-22 PROBLEM — U09.9 POST COVID-19 CONDITION, UNSPECIFIED: Status: RESOLVED | Noted: 2023-05-25 | Resolved: 2024-05-22

## 2024-05-22 PROBLEM — F32.1 DEPRESSION, MAJOR, SINGLE EPISODE, MODERATE (MULTI): Status: RESOLVED | Noted: 2023-03-20 | Resolved: 2024-05-22

## 2024-05-22 PROBLEM — N94.3 PREMENSTRUAL TENSION SYNDROME: Status: RESOLVED | Noted: 2023-03-20 | Resolved: 2024-05-22

## 2024-05-22 PROCEDURE — 3079F DIAST BP 80-89 MM HG: CPT | Performed by: INTERNAL MEDICINE

## 2024-05-22 PROCEDURE — 99214 OFFICE O/P EST MOD 30 MIN: CPT | Performed by: INTERNAL MEDICINE

## 2024-05-22 PROCEDURE — 3074F SYST BP LT 130 MM HG: CPT | Performed by: INTERNAL MEDICINE

## 2024-05-22 PROCEDURE — 1036F TOBACCO NON-USER: CPT | Performed by: INTERNAL MEDICINE

## 2024-05-22 NOTE — ASSESSMENT & PLAN NOTE
-We discussed the extreme importance of getting on CPAP therapy and she will let me know that she got her CPAP machine and that she is using it regularly

## 2024-05-22 NOTE — PATIENT INSTRUCTIONS
As we discussed I will have the staff give you the prescription for your CPAP machine today before leaving and please get this filled right away.  Please let me know that you are successfully using your CPAP therapy in the next couple of weeks and if there is anything we can do to help please let us know  I sent you a handout basically explaining fatty liver which is also really described as MASLD.  It is important that you eat a healthy diet, avoid regular use of alcohol, and weight loss.  Please let us know how things go with your consultation with the rheumatologist and as we discussed it looks like you have an appointment scheduled with Dr. Hahn so try to cancel that

## 2024-05-22 NOTE — PROGRESS NOTES
"Subjective   Patient ID: Mable Carrillo \"Jaqueline\" is a 23 y.o. female who presents for No chief complaint on file..  HPI  She is today for evaluation and accompanied by her significant other to discuss a CT scan that was performed at [facility] on April 27.  It was actually ordered by her rheumatologist at the [facility], Eli Timmons.  She states that she called the office and they indicated that the report had not been reviewed yet by her provider.  She was naturally anxious and made the appointment to come in here to discuss the findings.  Unfortunately I could not provide very much insight into the findings because they are nonspecific.  They are doing a workup for GPA and I told her that the rheumatologist will need to provide their expertise with the findings and whether they think it is associated with a rheumatologic disorder.  I told her that the radiologist is not indicating that it looks like a cancerous process etc. but I told her that if they do not have an explanation for the findings we could dig a little bit deeper.  She does understand sometimes we see these abnormal findings and they do not represent a disease process.  They might be the aftermath of previous infections and so forth.  We also talked about her liver health and the findings on the CT scan.  I am giving her a handout today that goes over MAS LD.  We talked about the importance of eating a healthy diet along with increased physical activity and weight loss.  She does not consume alcohol which is excellent.  We talked about the possibility of developing cirrhosis and the importance of getting her weight in check over time.  Lastly we talked about the mention of a possible pulmonary artery enlargement i.e. pulmonary hypertension.  We talked about her sleep apnea and the importance of treating the condition.  She states she has not gotten the CPAP equipment but she is planning to do so very soon " and will start wearing her CPAP therapy.  She will let me know if things or not going according to plan.  Objective   Physical Exam  Vitals and nursing note reviewed.   Constitutional:       General: She is not in acute distress.     Appearance: Normal appearance.   HENT:      Head: Normocephalic and atraumatic.   Eyes:      Conjunctiva/sclera: Conjunctivae normal.   Cardiovascular:      Rate and Rhythm: Normal rate and regular rhythm.      Heart sounds: Normal heart sounds.   Pulmonary:      Effort: No respiratory distress.      Breath sounds: No wheezing.   Abdominal:      Palpations: Abdomen is soft.      Tenderness: There is no abdominal tenderness. There is no guarding.   Musculoskeletal:         General: No swelling. Normal range of motion.   Skin:     General: Skin is warm and dry.   Neurological:      General: No focal deficit present.      Mental Status: She is alert and oriented to person, place, and time.   Psychiatric:         Behavior: Behavior normal.     CT LUNG 4-   No acute pulmonary findings.  2. Subcentimeter posterior left lung linear nodularity as described,  likely benign.  3. Mildly prominent nonspecific mediastinal and axillary nodes as  above.  4. Borderline prominence main pulmonary artery as described can be  seen in the setting of pulmonary arterial hypertension.  5. Mild soft tissue prominence anterior mediastinum probably relating  to residual thymic tissue.  Assessment/Plan   Problem List Items Addressed This Visit             ICD-10-CM    Obstructive sleep apnea syndrome G47.33     -We discussed the extreme importance of getting on CPAP therapy and she will let me know that she got her CPAP machine and that she is using it regularly         Metabolic dysfunction-associated steatotic liver disease (MASLD) K76.0     -Her recent liver enzymes were within normal range  -I am giving her a handout that explains fatty liver disease  -It was mentioned that she had the appearance of  fatty liver on a recent CT scan of the lungs ordered by her rheumatologist         Abnormal CT scan of lung - Primary R91.8     -CT scan of her chest was ordered by her rheumatologist at the [facility] and performed at [facility] on April 27  -There are several nonspecific findings  -She will be going over those test results and we will get an opinion from a rheumatologist regarding what it might represent.  She will get back with me if there are any concerns               Angela Staton, DO

## 2024-05-22 NOTE — ASSESSMENT & PLAN NOTE
-Her recent liver enzymes were within normal range  -I am giving her a handout that explains fatty liver disease  -It was mentioned that she had the appearance of fatty liver on a recent CT scan of the lungs ordered by her rheumatologist

## 2024-05-22 NOTE — ASSESSMENT & PLAN NOTE
-CT scan of her chest was ordered by her rheumatologist at the [facility] and performed at [facility] on April 27  -There are several nonspecific findings  -She will be going over those test results and we will get an opinion from a rheumatologist regarding what it might represent.  She will get back with me if there are any concerns

## 2024-06-03 ENCOUNTER — DOCUMENTATION (OUTPATIENT)
Dept: PHYSICAL THERAPY | Facility: CLINIC | Age: 24
End: 2024-06-03
Payer: COMMERCIAL

## 2024-06-03 NOTE — PROGRESS NOTES
"Physical Therapy    Discharge Summary    Name: Mable Carrillo \"Jaqueline\"  MRN: [id number]  : 2000  Date: 24    Discharge Summary: PT        Therapy Summary: Patient was seen for initial evaluation on 10/24/23 and attended 5 additional visits thru 23  She failed to return for any follow up appointments.  No goals could be assessed.  Patient will be discharged due to lack of further attendance/in accordance with our clinic's attendance policy.      Discharge Status: DC     Rehab Discharge Reason: Failed to schedule and/or keep follow-up appointment(s)  "
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
## 2024-08-20 ENCOUNTER — APPOINTMENT (OUTPATIENT)
Dept: CARDIOLOGY | Facility: CLINIC | Age: 24
End: 2024-08-20
Payer: COMMERCIAL

## 2024-08-20 VITALS
HEIGHT: 63 IN | SYSTOLIC BLOOD PRESSURE: 122 MMHG | WEIGHT: 236 LBS | BODY MASS INDEX: 41.82 KG/M2 | HEART RATE: 105 BPM | DIASTOLIC BLOOD PRESSURE: 98 MMHG

## 2024-08-20 DIAGNOSIS — R55 SYNCOPE, UNSPECIFIED SYNCOPE TYPE: ICD-10-CM

## 2024-08-20 DIAGNOSIS — R94.31 ABNORMAL EKG: ICD-10-CM

## 2024-08-20 DIAGNOSIS — E66.01 CLASS 3 SEVERE OBESITY DUE TO EXCESS CALORIES WITHOUT SERIOUS COMORBIDITY WITH BODY MASS INDEX (BMI) OF 40.0 TO 44.9 IN ADULT (MULTI): ICD-10-CM

## 2024-08-20 DIAGNOSIS — I15.9 SECONDARY HYPERTENSION: ICD-10-CM

## 2024-08-20 DIAGNOSIS — R60.9 NON-PITTING EDEMA: Primary | ICD-10-CM

## 2024-08-20 DIAGNOSIS — R42 DIZZINESS: ICD-10-CM

## 2024-08-20 DIAGNOSIS — R00.2 PALPITATION: ICD-10-CM

## 2024-08-20 PROCEDURE — 1036F TOBACCO NON-USER: CPT

## 2024-08-20 PROCEDURE — 99214 OFFICE O/P EST MOD 30 MIN: CPT

## 2024-08-20 PROCEDURE — 3008F BODY MASS INDEX DOCD: CPT

## 2024-08-20 PROCEDURE — 3080F DIAST BP >= 90 MM HG: CPT

## 2024-08-20 PROCEDURE — 3074F SYST BP LT 130 MM HG: CPT

## 2024-08-20 PROCEDURE — 93000 ELECTROCARDIOGRAM COMPLETE: CPT

## 2024-08-20 RX ORDER — POTASSIUM CHLORIDE 20 MEQ/1
20 TABLET, EXTENDED RELEASE ORAL 2 TIMES DAILY
COMMUNITY

## 2024-08-22 ENCOUNTER — APPOINTMENT (OUTPATIENT)
Age: 24
End: 2024-08-22
Payer: COMMERCIAL

## 2024-08-26 ENCOUNTER — APPOINTMENT (OUTPATIENT)
Dept: VASCULAR MEDICINE | Facility: HOSPITAL | Age: 24
End: 2024-08-26
Payer: COMMERCIAL

## 2024-08-26 ENCOUNTER — TELEPHONE (OUTPATIENT)
Dept: CARDIOLOGY | Facility: CLINIC | Age: 24
End: 2024-08-26
Payer: COMMERCIAL

## 2024-08-26 ENCOUNTER — HOSPITAL ENCOUNTER (OUTPATIENT)
Dept: VASCULAR MEDICINE | Facility: HOSPITAL | Age: 24
Discharge: HOME | End: 2024-08-26
Payer: COMMERCIAL

## 2024-08-26 DIAGNOSIS — R60.0 LOCALIZED EDEMA: ICD-10-CM

## 2024-08-26 DIAGNOSIS — R60.9 NON-PITTING EDEMA: ICD-10-CM

## 2024-08-26 PROCEDURE — 93970 EXTREMITY STUDY: CPT

## 2024-08-26 PROCEDURE — 93970 EXTREMITY STUDY: CPT | Performed by: STUDENT IN AN ORGANIZED HEALTH CARE EDUCATION/TRAINING PROGRAM

## 2024-08-26 NOTE — TELEPHONE ENCOUNTER
PT NOTIFIED BUT SHE IS WONDERING IF SHE SHOULD HAVE HER ARMS LOOKED AT?  SHE HAS BEEN HAVING SWELLING IN HER ARMS SINCE HER COVID.    ----- Message from Amber Lang sent at 8/26/2024  1:28 PM EDT -----  Please let Jaqueline know there is evidence of chronic venous insufficiency in the right upper and lower leg and the left lower leg. Patient would benefit from wearing thigh high compression stocking on the right and knee high on the left.

## 2024-08-27 ENCOUNTER — APPOINTMENT (OUTPATIENT)
Age: 24
End: 2024-08-27
Payer: COMMERCIAL

## 2024-08-30 ENCOUNTER — APPOINTMENT (OUTPATIENT)
Age: 24
End: 2024-08-30
Payer: COMMERCIAL

## 2024-09-09 DIAGNOSIS — M79.7 FIBROMYALGIA: ICD-10-CM

## 2024-09-11 RX ORDER — PREGABALIN 200 MG/1
200 CAPSULE ORAL 2 TIMES DAILY
Qty: 60 CAPSULE | Refills: 0 | OUTPATIENT
Start: 2024-09-11 | End: 2024-10-11

## 2024-09-11 RX ORDER — METHOCARBAMOL 500 MG/1
500 TABLET, FILM COATED ORAL 4 TIMES DAILY PRN
Qty: 40 TABLET | Refills: 0 | OUTPATIENT
Start: 2024-09-11 | End: 2024-11-10

## 2024-09-24 ENCOUNTER — APPOINTMENT (OUTPATIENT)
Dept: PRIMARY CARE | Facility: CLINIC | Age: 24
End: 2024-09-24
Payer: COMMERCIAL

## 2024-09-24 ENCOUNTER — TELEPHONE (OUTPATIENT)
Dept: PRIMARY CARE | Facility: CLINIC | Age: 24
End: 2024-09-24

## 2024-09-24 VITALS
HEART RATE: 76 BPM | SYSTOLIC BLOOD PRESSURE: 124 MMHG | BODY MASS INDEX: 41.82 KG/M2 | WEIGHT: 236 LBS | HEIGHT: 63 IN | DIASTOLIC BLOOD PRESSURE: 88 MMHG

## 2024-09-24 DIAGNOSIS — Z79.899 MEDICATION MANAGEMENT: ICD-10-CM

## 2024-09-24 DIAGNOSIS — R73.03 PREDIABETES: ICD-10-CM

## 2024-09-24 DIAGNOSIS — G62.9 NEUROPATHY: ICD-10-CM

## 2024-09-24 DIAGNOSIS — E66.01 CLASS 3 SEVERE OBESITY DUE TO EXCESS CALORIES WITHOUT SERIOUS COMORBIDITY WITH BODY MASS INDEX (BMI) OF 40.0 TO 44.9 IN ADULT: ICD-10-CM

## 2024-09-24 DIAGNOSIS — R71.8 ELEVATED RED BLOOD CELL COUNT: ICD-10-CM

## 2024-09-24 DIAGNOSIS — M79.7 FIBROMYALGIA: ICD-10-CM

## 2024-09-24 DIAGNOSIS — F90.9 ATTENTION DEFICIT HYPERACTIVITY DISORDER (ADHD), UNSPECIFIED ADHD TYPE: Primary | ICD-10-CM

## 2024-09-24 DIAGNOSIS — R53.1 WEAKNESS: ICD-10-CM

## 2024-09-24 DIAGNOSIS — Z76.89 ESTABLISHING CARE WITH NEW DOCTOR, ENCOUNTER FOR: ICD-10-CM

## 2024-09-24 DIAGNOSIS — E55.9 VITAMIN D DEFICIENCY: ICD-10-CM

## 2024-09-24 DIAGNOSIS — E78.5 HYPERLIPIDEMIA, UNSPECIFIED HYPERLIPIDEMIA TYPE: ICD-10-CM

## 2024-09-24 PROBLEM — D22.9 ATYPICAL MOLE: Status: RESOLVED | Noted: 2023-03-20 | Resolved: 2024-09-24

## 2024-09-24 PROCEDURE — 80358 DRUG SCREENING METHADONE: CPT

## 2024-09-24 PROCEDURE — 99214 OFFICE O/P EST MOD 30 MIN: CPT | Performed by: NURSE PRACTITIONER

## 2024-09-24 PROCEDURE — 80373 DRUG SCREENING TRAMADOL: CPT

## 2024-09-24 PROCEDURE — 80346 BENZODIAZEPINES1-12: CPT

## 2024-09-24 PROCEDURE — 80354 DRUG SCREENING FENTANYL: CPT

## 2024-09-24 PROCEDURE — 3008F BODY MASS INDEX DOCD: CPT | Performed by: NURSE PRACTITIONER

## 2024-09-24 PROCEDURE — 3074F SYST BP LT 130 MM HG: CPT | Performed by: NURSE PRACTITIONER

## 2024-09-24 PROCEDURE — 3079F DIAST BP 80-89 MM HG: CPT | Performed by: NURSE PRACTITIONER

## 2024-09-24 PROCEDURE — 82570 ASSAY OF URINE CREATININE: CPT

## 2024-09-24 PROCEDURE — 80365 DRUG SCREENING OXYCODONE: CPT

## 2024-09-24 PROCEDURE — 1036F TOBACCO NON-USER: CPT | Performed by: NURSE PRACTITIONER

## 2024-09-24 PROCEDURE — 80307 DRUG TEST PRSMV CHEM ANLYZR: CPT

## 2024-09-24 PROCEDURE — 80368 SEDATIVE HYPNOTICS: CPT

## 2024-09-24 PROCEDURE — 80361 OPIATES 1 OR MORE: CPT

## 2024-09-24 RX ORDER — METHYLPHENIDATE HYDROCHLORIDE 27 MG/1
27 TABLET ORAL EVERY MORNING
Qty: 30 TABLET | Refills: 0 | Status: SHIPPED | OUTPATIENT
Start: 2024-09-24 | End: 2024-10-24

## 2024-09-24 RX ORDER — METHOCARBAMOL 500 MG/1
500 TABLET, FILM COATED ORAL 4 TIMES DAILY PRN
Qty: 120 TABLET | Refills: 2 | Status: SHIPPED | OUTPATIENT
Start: 2024-09-24

## 2024-09-24 RX ORDER — METHOCARBAMOL 500 MG/1
500 TABLET, FILM COATED ORAL 4 TIMES DAILY PRN
Qty: 40 TABLET | Refills: 0 | Status: SHIPPED | OUTPATIENT
Start: 2024-09-24 | End: 2024-09-24 | Stop reason: SDUPTHER

## 2024-09-24 RX ORDER — PREGABALIN 200 MG/1
200 CAPSULE ORAL 2 TIMES DAILY
Qty: 60 CAPSULE | Refills: 0 | Status: SHIPPED | OUTPATIENT
Start: 2024-09-24

## 2024-09-24 ASSESSMENT — ENCOUNTER SYMPTOMS
SPEECH DIFFICULTY: 0
DIFFICULTY URINATING: 0
PALPITATIONS: 0
HEADACHES: 0
ABDOMINAL DISTENTION: 0
ABDOMINAL PAIN: 0
WHEEZING: 0
DYSURIA: 0
FEVER: 0
FATIGUE: 0
NUMBNESS: 1
WOUND: 0
ARTHRALGIAS: 0
DIARRHEA: 0
SORE THROAT: 0
EYE REDNESS: 0
PHOTOPHOBIA: 0
TROUBLE SWALLOWING: 0
FACIAL ASYMMETRY: 0
NECK PAIN: 0
DIZZINESS: 0
MYALGIAS: 0
NAUSEA: 0
SLEEP DISTURBANCE: 0
BACK PAIN: 0
FLANK PAIN: 0
JOINT SWELLING: 0
CHOKING: 0
FREQUENCY: 0
CHILLS: 0
SHORTNESS OF BREATH: 0
COUGH: 0
APNEA: 0
CONFUSION: 0
BLOOD IN STOOL: 0
NERVOUS/ANXIOUS: 0
EYE PAIN: 0
CHEST TIGHTNESS: 0
SEIZURES: 0
VOMITING: 0
HEMATURIA: 0
UNEXPECTED WEIGHT CHANGE: 0
CONSTIPATION: 0
WEAKNESS: 1

## 2024-09-24 ASSESSMENT — PATIENT HEALTH QUESTIONNAIRE - PHQ9
2. FEELING DOWN, DEPRESSED OR HOPELESS: SEVERAL DAYS
1. LITTLE INTEREST OR PLEASURE IN DOING THINGS: SEVERAL DAYS
SUM OF ALL RESPONSES TO PHQ9 QUESTIONS 1 AND 2: 2

## 2024-09-24 NOTE — PROGRESS NOTES
"Subjective   Patient ID: Mable Carrillo \"Jaqueline\" is a 24 y.o. female who presents for New Patient Visit (VIRTUAL VISIT - NEW PATIENT TO ESTABLISH. TRANSFER DR. ROYAL. C/O SWELLING AND WEAKNESS IN BOTH ARMS OFF/ON SINCE HAVING COVID OCTOBER 2022. DIAGNOSED WITH ADHD DECEMBER 2023 BY [id number] AND WOULD LIKE FOR OUR OFFICE TO TAKE OVER PRESCRIBING CONCERTA. REQUESTING REFILLS ON LYRICA, CYMBALTA, AND METHOCARBAMOL - FIBROMYALGIA. ).    VIRTUAL APPOINTMENT BEING PERFORMED    HPI:  Presents today TO ESTABLISH CARE.  C/O BUE WEAKNESS AND SWELLING X 2 YEARS ON/OFF modifying factors consists of STARTED AFTER HAVING COVID IN 2022. FOLLOWING WITH [facility] CHECKING HER FOR AUTOIMMUNE DISORDERS. THEY RECOMMENDED SHE DISCUSS FURTHER WITH ME associated symptoms consist of DOES HAVE NEUROPAHY IN  B/L HANDS. NECK PAIN ON/OFF. SWELLING TO NECK AND ARMS OCCUR ON/OFF. prior treatment consists of medication CYMBALTA AND LYRICA      MED REFILL OF LYRICA WHICH SHE TAKES FOR FIBRO. SHE IS DOING WELL ON MED AND DENIES SIDE EFFECTS.     MED REFILL OF CONCERTA WHICH SHE TAKES FOR ADD. SHE IS DOING WELL ON MED AND DENIES SIDE EFFECTS. [facility] STARTED HER ON MED BUT IS REQUESTING PCP TAKE OVER  SYNCOPE- SEEN CARDIO. FOLLOWING WITH [facility] TO FIND CAUSE.     OARRS:  Zainab Gomez, SARAHY-CNP on 9/24/2024  9:19 AM  I have personally reviewed the OARRS report for Mable Carrillo. I have considered the risks of abuse, dependence, addiction and diversion and I believe that it is clinically appropriate for Mable Carrillo to be prescribed this medication    Is the patient prescribed a combination of a benzodiazepine and opioid?  No    Last Urine Drug Screen / ordered today: Yes  No results found for this or any previous visit (from the past 8760 hour(s)).  N/A          Controlled Substance Agreement:  Date of the Last Agreement: 09/24/2024  Reviewed Controlled " "Substance Agreement including but not limited to the benefits, risks, and alternatives to treatment with a Controlled Substance medication(s).      Visit Vitals  /88   Pulse 76   Ht 1.6 m (5' 3\")   Wt 107 kg (236 lb)   BMI 41.81 kg/m²   OB Status Having periods   Smoking Status Never   BSA 2.18 m²        Review of Systems   Constitutional:  Negative for chills, fatigue, fever and unexpected weight change.   HENT:  Negative for congestion, ear pain, sore throat and trouble swallowing.    Eyes:  Negative for photophobia, pain, redness and visual disturbance.   Respiratory:  Negative for apnea, cough, choking, chest tightness, shortness of breath and wheezing.    Cardiovascular:  Negative for chest pain, palpitations and leg swelling.   Gastrointestinal:  Negative for abdominal distention, abdominal pain, blood in stool, constipation, diarrhea, nausea and vomiting.   Genitourinary:  Negative for difficulty urinating, dysuria, flank pain, frequency, hematuria and urgency.   Musculoskeletal:  Negative for arthralgias, back pain, gait problem, joint swelling, myalgias and neck pain.   Skin:  Negative for rash and wound.   Neurological:  Positive for weakness and numbness. Negative for dizziness, seizures, syncope, facial asymmetry, speech difficulty and headaches.   Psychiatric/Behavioral:  Negative for confusion, sleep disturbance and suicidal ideas. The patient is not nervous/anxious.        Objective   Records reviewed    Physical Exam  Neurological:      Mental Status: She is alert and oriented to person, place, and time.   Psychiatric:         Mood and Affect: Mood normal.         Behavior: Behavior normal.         Thought Content: Thought content normal.         Judgment: Judgment normal.            Assessment/Plan   Problem List Items Addressed This Visit       Class 3 severe obesity due to excess calories without serious comorbidity with body mass index (BMI) of 40.0 to 44.9 in adult (Multi)    Fibromyalgia "    Relevant Medications    methocarbamol (Robaxin) 500 mg tablet    Vitamin D deficiency    Relevant Orders    Vitamin D 25-Hydroxy,Total (for eval of Vitamin D levels)    Parathyroid Hormone, Intact    Prediabetes    Relevant Orders    Hemoglobin A1C    Comprehensive Metabolic Panel    CBC and Auto Differential    ADHD - Primary    Hyperlipidemia    Relevant Orders    Lipid Panel    TSH with reflex to Free T4 if abnormal     Other Visit Diagnoses       Weakness        Relevant Orders    Vascular US upper PVR    EMG & nerve conduction    Neuropathy        Relevant Orders    Vascular US upper PVR    EMG & nerve conduction    Ferritin    Folate    Iron and TIBC    Medication management        Relevant Orders    Opiate/Opioid/Benzo Prescription Compliance    OOB Internal Tracking    Establishing care with new doctor, encounter for        Elevated red blood cell count        Relevant Orders    Ferritin    Folate    Vitamin B12    Iron and TIBC        I WILL ORDER BUE EMG AND BUE PVR.   I DISCUSSED CONTINUING CONCERTA WITH DR. LINDSAY AND HE IS IN AGREEMENT WITH THE CONTINUATION. AFTER HER VIRTUAL VISIT WAS COMPLETED, SHE CAME IN OFFICE FOR UTOX AND CSA.  WE DISCUSSED MOST COMMON SIDE EFFECTS OF PRESCRIBED MEDICATIONS. INDICATIONS, RISK, COMPLICATIONS, AND ALTERNATIVES OF MEDICATION/THERAPEUTICS WERE EXPLAINED AND DISCUSSED. PLEASE MONITOR CLOSELY FOR ANY UNTOWARD SIDE EFFECTS OR COMPLICATIONS OF MEDICATIONS. PATIENT IS STRONGLY ADVISED TO BE COMPLIANT WITH RECOMMENDATIONS. QUESTIONS AND CONCERNS WERE ADDRESSED. INSTRUCTED TO CALL, RETURN SOONER, OR GO TO THE ER,  IF SYMPTOMS PERSIST OR WORSEN. THEY VOICED UNDERSTANDING AND  DENIES FURTHER QUESTIONS AT THIS TIME.    TIME CODE  1. PREPARATION FOR PATIENT'S VISIT (REVIEWING CHART, CURRENT MEDICAL RECORDS, OUTSIDE HEALTH PROVIDER RECORDS, PREVIOUS HISTORY, EXAM, TEST, PROCEDURE, AND MEDICATIONS)  2. FACE TO FACE ENCOUNTER OBTAINING HISTORY FROM THE  PATIENT/FAMILY/CAREGIVERS; PERFORMING EVALUATION AND EXAMINATION; ORDERING TESTS OR PROCEDURES; REFERRING AND COMMUNICATING WITH OTHER HEALTHCARE PROVIDERS; COUNSELING AND EDUCATION OF THE PATIENT/FAMILY/CAREGIVERS; INDEPENDENTLY INTERPRETING RESULTS (TESTS, LABS, PROCEDURES, IMAGING) AND COMMUNICATING AND EXPLAINING RESULTS TO THE PATIENT/FAMILY/CAREGIVERS  3. COORDINATION OF CARE; PREPARING AND PRINTING DISCHARGE INSTRUCTIONS AND ANY EDUCATIONAL MATERIAL FOR THE PATIENT/FAMILY/CAREGIVERS. DOCUMENTING CLINICAL INFORMATION IN THE ELECTRONIC MEDICAL RECORD   4. REVIEWING OARRS AS NEEDED    MDM  1) COMPLEXITY: MORE THAN 1 STABLE CHRONIC CONDITION ADDRESSED OR 1 ACUTE ILLNESS ADDRESSED   2)DATA: TESTS INTERPRETED AND OR ORDERED, TOOK INDEPENDENT HISTORY OR RECORDS REVIEWED  3)RISK: MODERATE RISK DUE TO NATURE OF MEDICAL CONDITIONS/COMORBIDITY OR MEDICATIONS ORDERED OR SURGICAL OR PROCEDURE REFERRAL    1 MONTH MED REFILL WITH LABS

## 2024-09-24 NOTE — TELEPHONE ENCOUNTER
PT HAS AN ORDER FOR AN EMG THAT NEEDS PRE-CERT PT PREFERS TOMMY.    Vascular US upper PVR SCHEDULED FOR 10/15 THAT NEEDS PRE-CERT    PLEASE ADVISE    THANK YOU

## 2024-09-25 LAB
AMPHETAMINES UR QL SCN: NORMAL
BARBITURATES UR QL SCN: NORMAL
BZE UR QL SCN: NORMAL
CANNABINOIDS UR QL SCN: NORMAL
CREAT UR-MCNC: 79.6 MG/DL (ref 20–320)
PCP UR QL SCN: NORMAL

## 2024-10-01 NOTE — TELEPHONE ENCOUNTER
Never heard back from insurance and so I contacted  and the say the pt already picked up rx yesterday. It ran through insurance

## 2024-10-12 ENCOUNTER — LAB (OUTPATIENT)
Dept: LAB | Facility: LAB | Age: 24
End: 2024-10-12
Payer: COMMERCIAL

## 2024-10-12 DIAGNOSIS — E55.9 VITAMIN D DEFICIENCY: ICD-10-CM

## 2024-10-12 DIAGNOSIS — R71.8 ELEVATED RED BLOOD CELL COUNT: ICD-10-CM

## 2024-10-12 DIAGNOSIS — G62.9 NEUROPATHY: ICD-10-CM

## 2024-10-12 DIAGNOSIS — E78.5 HYPERLIPIDEMIA, UNSPECIFIED HYPERLIPIDEMIA TYPE: ICD-10-CM

## 2024-10-12 DIAGNOSIS — R73.03 PREDIABETES: ICD-10-CM

## 2024-10-12 LAB
25(OH)D3 SERPL-MCNC: 31 NG/ML (ref 30–100)
ALBUMIN SERPL BCP-MCNC: 4.6 G/DL (ref 3.4–5)
ALP SERPL-CCNC: 68 U/L (ref 33–110)
ALT SERPL W P-5'-P-CCNC: 39 U/L (ref 7–45)
ANION GAP SERPL CALC-SCNC: 13 MMOL/L (ref 10–20)
AST SERPL W P-5'-P-CCNC: 31 U/L (ref 9–39)
BASOPHILS # BLD AUTO: 0.09 X10*3/UL (ref 0–0.1)
BASOPHILS NFR BLD AUTO: 1 %
BILIRUB SERPL-MCNC: 0.7 MG/DL (ref 0–1.2)
BUN SERPL-MCNC: 14 MG/DL (ref 6–23)
CALCIUM SERPL-MCNC: 9.8 MG/DL (ref 8.6–10.3)
CHLORIDE SERPL-SCNC: 99 MMOL/L (ref 98–107)
CHOLEST SERPL-MCNC: 188 MG/DL (ref 0–199)
CHOLESTEROL/HDL RATIO: 3
CO2 SERPL-SCNC: 27 MMOL/L (ref 21–32)
CREAT SERPL-MCNC: 0.75 MG/DL (ref 0.5–1.05)
EGFRCR SERPLBLD CKD-EPI 2021: >90 ML/MIN/1.73M*2
EOSINOPHIL # BLD AUTO: 0.15 X10*3/UL (ref 0–0.7)
EOSINOPHIL NFR BLD AUTO: 1.6 %
ERYTHROCYTE [DISTWIDTH] IN BLOOD BY AUTOMATED COUNT: 13.5 % (ref 11.5–14.5)
EST. AVERAGE GLUCOSE BLD GHB EST-MCNC: 123 MG/DL
FERRITIN SERPL-MCNC: 66 NG/ML (ref 8–150)
FOLATE SERPL-MCNC: 9.8 NG/ML
GLUCOSE SERPL-MCNC: 104 MG/DL (ref 74–99)
HBA1C MFR BLD: 5.9 %
HCT VFR BLD AUTO: 44.4 % (ref 36–46)
HDLC SERPL-MCNC: 63 MG/DL
HGB BLD-MCNC: 14.5 G/DL (ref 12–16)
IMM GRANULOCYTES # BLD AUTO: 0.03 X10*3/UL (ref 0–0.7)
IMM GRANULOCYTES NFR BLD AUTO: 0.3 % (ref 0–0.9)
IRON SATN MFR SERPL: 29 % (ref 25–45)
IRON SERPL-MCNC: 100 UG/DL (ref 35–150)
LDLC SERPL CALC-MCNC: 84 MG/DL
LYMPHOCYTES # BLD AUTO: 2.93 X10*3/UL (ref 1.2–4.8)
LYMPHOCYTES NFR BLD AUTO: 31.3 %
MCH RBC QN AUTO: 27.5 PG (ref 26–34)
MCHC RBC AUTO-ENTMCNC: 32.7 G/DL (ref 32–36)
MCV RBC AUTO: 84 FL (ref 80–100)
MONOCYTES # BLD AUTO: 0.7 X10*3/UL (ref 0.1–1)
MONOCYTES NFR BLD AUTO: 7.5 %
NEUTROPHILS # BLD AUTO: 5.45 X10*3/UL (ref 1.2–7.7)
NEUTROPHILS NFR BLD AUTO: 58.3 %
NON HDL CHOLESTEROL: 125 MG/DL (ref 0–149)
NRBC BLD-RTO: 0 /100 WBCS (ref 0–0)
PLATELET # BLD AUTO: 470 X10*3/UL (ref 150–450)
POTASSIUM SERPL-SCNC: 3.5 MMOL/L (ref 3.5–5.3)
PROT SERPL-MCNC: 7.8 G/DL (ref 6.4–8.2)
RBC # BLD AUTO: 5.28 X10*6/UL (ref 4–5.2)
SODIUM SERPL-SCNC: 135 MMOL/L (ref 136–145)
TIBC SERPL-MCNC: 343 UG/DL (ref 240–445)
TRIGL SERPL-MCNC: 206 MG/DL (ref 0–149)
TSH SERPL-ACNC: 1.85 MIU/L (ref 0.44–3.98)
UIBC SERPL-MCNC: 243 UG/DL (ref 110–370)
VIT B12 SERPL-MCNC: 662 PG/ML (ref 211–911)
VLDL: 41 MG/DL (ref 0–40)
WBC # BLD AUTO: 9.4 X10*3/UL (ref 4.4–11.3)

## 2024-10-12 PROCEDURE — 82607 VITAMIN B-12: CPT

## 2024-10-12 PROCEDURE — 83540 ASSAY OF IRON: CPT

## 2024-10-12 PROCEDURE — 85025 COMPLETE CBC W/AUTO DIFF WBC: CPT

## 2024-10-12 PROCEDURE — 83036 HEMOGLOBIN GLYCOSYLATED A1C: CPT

## 2024-10-12 PROCEDURE — 82746 ASSAY OF FOLIC ACID SERUM: CPT

## 2024-10-12 PROCEDURE — 80061 LIPID PANEL: CPT

## 2024-10-12 PROCEDURE — 36415 COLL VENOUS BLD VENIPUNCTURE: CPT

## 2024-10-12 PROCEDURE — 84443 ASSAY THYROID STIM HORMONE: CPT

## 2024-10-12 PROCEDURE — 80053 COMPREHEN METABOLIC PANEL: CPT

## 2024-10-12 PROCEDURE — 82306 VITAMIN D 25 HYDROXY: CPT

## 2024-10-12 PROCEDURE — 83550 IRON BINDING TEST: CPT

## 2024-10-12 PROCEDURE — 83970 ASSAY OF PARATHORMONE: CPT

## 2024-10-12 PROCEDURE — 82728 ASSAY OF FERRITIN: CPT

## 2024-10-13 LAB — PTH-INTACT SERPL-MCNC: 106.8 PG/ML (ref 18.5–88)

## 2024-10-15 ENCOUNTER — HOSPITAL ENCOUNTER (OUTPATIENT)
Dept: VASCULAR MEDICINE | Facility: HOSPITAL | Age: 24
Discharge: HOME | End: 2024-10-15
Payer: COMMERCIAL

## 2024-10-15 DIAGNOSIS — G62.9 NEUROPATHY: ICD-10-CM

## 2024-10-15 DIAGNOSIS — R53.1 WEAKNESS: ICD-10-CM

## 2024-10-15 DIAGNOSIS — R60.0 LOCALIZED EDEMA: ICD-10-CM

## 2024-10-15 PROCEDURE — 93930 UPPER EXTREMITY STUDY: CPT | Performed by: SURGERY

## 2024-10-15 PROCEDURE — 93922 UPR/L XTREMITY ART 2 LEVELS: CPT

## 2024-10-15 PROCEDURE — 93922 UPR/L XTREMITY ART 2 LEVELS: CPT | Performed by: SURGERY

## 2024-10-17 ENCOUNTER — TELEMEDICINE (OUTPATIENT)
Dept: PRIMARY CARE | Facility: CLINIC | Age: 24
End: 2024-10-17
Payer: COMMERCIAL

## 2024-10-17 VITALS — HEIGHT: 63 IN | BODY MASS INDEX: 41.82 KG/M2 | WEIGHT: 236 LBS

## 2024-10-17 DIAGNOSIS — E21.3 HYPERPARATHYROIDISM (MULTI): ICD-10-CM

## 2024-10-17 DIAGNOSIS — F90.9 ATTENTION DEFICIT HYPERACTIVITY DISORDER (ADHD), UNSPECIFIED ADHD TYPE: ICD-10-CM

## 2024-10-17 DIAGNOSIS — E66.813 CLASS 3 SEVERE OBESITY DUE TO EXCESS CALORIES WITHOUT SERIOUS COMORBIDITY WITH BODY MASS INDEX (BMI) OF 40.0 TO 44.9 IN ADULT: ICD-10-CM

## 2024-10-17 DIAGNOSIS — R73.03 PREDIABETES: Primary | ICD-10-CM

## 2024-10-17 DIAGNOSIS — E66.01 CLASS 3 SEVERE OBESITY DUE TO EXCESS CALORIES WITHOUT SERIOUS COMORBIDITY WITH BODY MASS INDEX (BMI) OF 40.0 TO 44.9 IN ADULT: ICD-10-CM

## 2024-10-17 DIAGNOSIS — M79.7 FIBROMYALGIA: ICD-10-CM

## 2024-10-17 DIAGNOSIS — E55.9 VITAMIN D DEFICIENCY: ICD-10-CM

## 2024-10-17 DIAGNOSIS — E78.5 HYPERLIPIDEMIA, UNSPECIFIED HYPERLIPIDEMIA TYPE: ICD-10-CM

## 2024-10-17 PROBLEM — R00.2 PALPITATION: Status: RESOLVED | Noted: 2023-03-21 | Resolved: 2024-10-17

## 2024-10-17 PROBLEM — R60.9 NON-PITTING EDEMA: Status: RESOLVED | Noted: 2024-08-20 | Resolved: 2024-10-17

## 2024-10-17 PROBLEM — R94.31 ABNORMAL EKG: Status: RESOLVED | Noted: 2024-04-09 | Resolved: 2024-10-17

## 2024-10-17 PROBLEM — R60.0 LOCALIZED EDEMA: Status: RESOLVED | Noted: 2023-05-25 | Resolved: 2024-10-17

## 2024-10-17 PROCEDURE — 3008F BODY MASS INDEX DOCD: CPT | Performed by: NURSE PRACTITIONER

## 2024-10-17 PROCEDURE — 1036F TOBACCO NON-USER: CPT | Performed by: NURSE PRACTITIONER

## 2024-10-17 PROCEDURE — 99214 OFFICE O/P EST MOD 30 MIN: CPT | Performed by: NURSE PRACTITIONER

## 2024-10-17 RX ORDER — METHYLPHENIDATE HYDROCHLORIDE 27 MG/1
27 TABLET, EXTENDED RELEASE ORAL EVERY MORNING
Qty: 30 TABLET | Refills: 0 | Status: SHIPPED | OUTPATIENT
Start: 2024-10-17 | End: 2024-11-16

## 2024-10-17 RX ORDER — VIT C/E/ZN/COPPR/LUTEIN/ZEAXAN 250MG-90MG
125 CAPSULE ORAL DAILY
COMMUNITY

## 2024-10-17 RX ORDER — FERROUS SULFATE, DRIED 160(50) MG
1 TABLET, EXTENDED RELEASE ORAL DAILY
Qty: 90 TABLET | Refills: 3 | Status: SHIPPED | OUTPATIENT
Start: 2024-10-17

## 2024-10-17 RX ORDER — PREGABALIN 200 MG/1
200 CAPSULE ORAL 2 TIMES DAILY
Qty: 60 CAPSULE | Refills: 0 | Status: SHIPPED | OUTPATIENT
Start: 2024-10-17

## 2024-10-17 ASSESSMENT — ENCOUNTER SYMPTOMS
EYE REDNESS: 0
ABDOMINAL DISTENTION: 0
UNEXPECTED WEIGHT CHANGE: 0
COUGH: 0
MYALGIAS: 0
APNEA: 0
EYE PAIN: 0
NUMBNESS: 0
DIZZINESS: 1
WOUND: 0
CHEST TIGHTNESS: 0
PALPITATIONS: 0
JOINT SWELLING: 0
CHOKING: 0
HEADACHES: 0
FREQUENCY: 0
SORE THROAT: 0
SPEECH DIFFICULTY: 0
HEMATURIA: 0
VOMITING: 0
PHOTOPHOBIA: 0
BACK PAIN: 0
FEVER: 0
ARTHRALGIAS: 0
ABDOMINAL PAIN: 0
WEAKNESS: 0
TROUBLE SWALLOWING: 0
SLEEP DISTURBANCE: 0
CONSTIPATION: 0
DYSURIA: 0
NECK PAIN: 0
WHEEZING: 0
SEIZURES: 0
FACIAL ASYMMETRY: 0
BLOOD IN STOOL: 0
NERVOUS/ANXIOUS: 0
DIFFICULTY URINATING: 0
CONFUSION: 0
FATIGUE: 1
DIARRHEA: 0
SHORTNESS OF BREATH: 0
CHILLS: 0
FLANK PAIN: 0
NAUSEA: 0

## 2024-10-17 ASSESSMENT — PATIENT HEALTH QUESTIONNAIRE - PHQ9
2. FEELING DOWN, DEPRESSED OR HOPELESS: NOT AT ALL
1. LITTLE INTEREST OR PLEASURE IN DOING THINGS: NOT AT ALL
SUM OF ALL RESPONSES TO PHQ9 QUESTIONS 1 AND 2: 0

## 2024-10-17 NOTE — PROGRESS NOTES
"Subjective   Patient ID: Mable Carrillo \"Jaqueline\" is a 24 y.o. female who presents for Follow-up (1 MO F/U WITH LABS. DISCUSS FMLA FOR FIBROMYALGIA).    Virtual or Telephone Consent    An interactive audio and video telecommunication system which permits real time communications between the patient (at the originating site) and provider (at the distant site) was utilized to provide this telehealth service.   Verbal consent was requested and obtained from Mable Carrillo on this date, 10/17/24 for a telehealth visit.     HPI:  Presents today for [facility].     MED REFILL OF LYRICA WHICH SHE TAKES FOR FIBRO. SHE IS DOING WELL ON MED AND DENIES SIDE EFFECTS.      MED REFILL OF CONCERTA WHICH SHE TAKES FOR ADD. SHE IS DOING WELL ON MED AND DENIES SIDE EFFECTS.    SYNCOPE- SEEN CARDIO. FOLLOWING WITH [facility] TO FIND CAUSE.   PTH- START CALCIUM WITH VIT D. OBTAIN PTH US AND RECHECK IN 1 MONTH  PREDIABETES-  DIET MODIFICATIONS DISCUSSED  LIPIDS-  DIET MODIFICATIONS DISCUSSED. REFUSING MED MANAGEMENT AT THIS TIME. WILL RECHECK IN 6 MONTHS  PREDIABETES- STABLE    FIBRO- REQUESTING FMLA PAPERWORK. SHE MISSED WORK ABOUT 2-4 TIMES PER MONTH. 1-2 DAYS PER EPISODE. MARCH 2021 DX WITH FIBRO. FLARES INCLUDED INCREASED JOINT PAIN, EDEMA, AND FATIGUE.       OARRS:  Zainab Gomez, SARAHY-CNP on 10/17/2024  8:41 AM  I have personally reviewed the OARRS report for Mable Carrillo. I have considered the risks of abuse, dependence, addiction and diversion and I believe that it is clinically appropriate for Mable Carrillo to be prescribed this medication    Is the patient prescribed a combination of a benzodiazepine and opioid?  No    Last Urine Drug Screen / ordered today: No  Recent Results (from the past 8760 hours)   Confirmation Opiate/Opioid/Benzo Prescription Compliance    Collection Time: 09/24/24  2:06 PM   Result Value Ref Range    Clonazepam <25 <25 ng/mL " "   7-Aminoclonazepam <25 <25 ng/mL    Alprazolam <25 <25 ng/mL    Alpha-Hydroxyalprazolam <25 <25 ng/mL    Midazolam <25 <25 ng/mL    Alpha-Hydroxymidazolam <25 <25 ng/mL    Chlordiazepoxide <25 <25 ng/mL    Diazepam <25 <25 ng/mL    Nordiazepam <25 <25 ng/mL    Temazepam <25 <25 ng/mL    Oxazepam <25 <25 ng/mL    Lorazepam <25 <25 ng/mL    Methadone <25 <25 ng/mL    EDDP <25 <25 ng/mL    6-Acetylmorphine <25 <25 ng/mL    Codeine <50 <50 ng/mL    Hydrocodone <25 <25 ng/mL    Hydromorphone <25 <25 ng/mL    Morphine  <50 <50 ng/mL    Norhydrocodone <25 <25 ng/mL    Noroxycodone <25 <25 ng/mL    Oxycodone <25 <25 ng/mL    Oxymorphone <25 <25 ng/mL    Fentanyl <2.5 <2.5 ng/mL    Norfentanyl <2.5 <2.5 ng/mL    Tramadol <50 <50 ng/mL    O-Desmethyltramadol <50 <50 ng/mL    Zolpidem <25 <25 ng/mL    Zolpidem Metabolite (ZCA) <25 <25 ng/mL   Screen Opiate/Opioid/Benzo Prescription Compliance    Collection Time: 09/24/24  2:05 PM   Result Value Ref Range    Creatinine, Urine Random 79.6 20.0 - 320.0 mg/dL    Amphetamine Screen, Urine Presumptive Negative Presumptive Negative    Barbiturate Screen, Urine Presumptive Negative Presumptive Negative    Cannabinoid Screen, Urine Presumptive Negative Presumptive Negative    Cocaine Metabolite Screen, Urine Presumptive Negative Presumptive Negative    PCP Screen, Urine Presumptive Negative Presumptive Negative     Results are as expected.         Controlled Substance Agreement:  Date of the Last Agreement: 09/24/2024  Reviewed Controlled Substance Agreement including but not limited to the benefits, risks, and alternatives to treatment with a Controlled Substance medication(s).      Visit Vitals  Ht 1.6 m (5' 3\")   Wt 107 kg (236 lb)   BMI 41.81 kg/m²   OB Status Having periods   Smoking Status Never   BSA 2.18 m²        Review of Systems   Constitutional:  Positive for fatigue. Negative for chills, fever and unexpected weight change.   HENT:  Negative for congestion, ear pain, sore " throat and trouble swallowing.    Eyes:  Negative for photophobia, pain, redness and visual disturbance.   Respiratory:  Negative for apnea, cough, choking, chest tightness, shortness of breath and wheezing.    Cardiovascular:  Negative for chest pain, palpitations and leg swelling.   Gastrointestinal:  Negative for abdominal distention, abdominal pain, blood in stool, constipation, diarrhea, nausea and vomiting.   Genitourinary:  Negative for difficulty urinating, dysuria, flank pain, frequency, hematuria and urgency.   Musculoskeletal:  Negative for arthralgias, back pain, gait problem, joint swelling, myalgias and neck pain.   Skin:  Negative for rash and wound.   Neurological:  Positive for dizziness. Negative for seizures, syncope, facial asymmetry, speech difficulty, weakness, numbness and headaches.   Psychiatric/Behavioral:  Negative for confusion, sleep disturbance and suicidal ideas. The patient is not nervous/anxious.        Objective   Component      Latest Ref [facility] 10/12/2024   WBC      4.4 - 11.3 x10*3/uL 9.4    nRBC      0.0 - 0.0 /100 WBCs 0.0    RBC      4.00 - 5.20 x10*6/uL 5.28 (H)    HEMOGLOBIN      12.0 - 16.0 g/dL 14.5    HEMATOCRIT      36.0 - 46.0 % 44.4    MCV      80 - 100 fL 84    MCH      26.0 - 34.0 pg 27.5    MCHC      32.0 - 36.0 g/dL 32.7    RED CELL DISTRIBUTION WIDTH      11.5 - 14.5 % 13.5    Platelets      150 - 450 x10*3/uL 470 (H)    Neutrophils %      40.0 - 80.0 % 58.3    Immature Granulocytes %, Automated      0.0 - 0.9 % 0.3    Lymphocytes %      13.0 - 44.0 % 31.3    Monocytes %      2.0 - 10.0 % 7.5    Eosinophils %      0.0 - 6.0 % 1.6    Basophils %      0.0 - 2.0 % 1.0    Neutrophils Absolute      1.20 - 7.70 x10*3/uL 5.45    Immature Granulocytes Absolute, Automated      0.00 - 0.70 x10*3/uL 0.03    Lymphocytes Absolute      1.20 - 4.80 x10*3/uL 2.93    Monocytes Absolute      0.10 - 1.00 x10*3/uL 0.70    Eosinophils Absolute      0.00 - 0.70 x10*3/uL 0.15    Basophils  Absolute      0.00 - 0.10 x10*3/uL 0.09    GLUCOSE      74 - 99 mg/dL 104 (H)    SODIUM      136 - 145 mmol/L 135 (L)    POTASSIUM      3.5 - 5.3 mmol/L 3.5    CHLORIDE      98 - 107 mmol/L 99    Bicarbonate      21 - 32 mmol/L 27    Anion Gap      10 - 20 mmol/L 13    Blood Urea Nitrogen      6 - 23 mg/dL 14    Creatinine      0.50 - 1.05 mg/dL 0.75    EGFR      >60 mL/min/1.73m*2 >90    Calcium      8.6 - 10.3 mg/dL 9.8    Albumin      3.4 - 5.0 g/dL 4.6    Alkaline Phosphatase      33 - 110 U/L 68    Total Protein      6.4 - 8.2 g/dL 7.8    AST      9 - 39 U/L 31    Bilirubin Total      0.0 - 1.2 mg/dL 0.7    ALT      7 - 45 U/L 39    CHOLESTEROL      0 - 199 mg/dL 188    HDL CHOLESTEROL      mg/dL 63.0    Cholesterol/HDL Ratio 3.0    LDL Calculated      <=99 mg/dL 84    VLDL      0 - 40 mg/dL 41 (H)    TRIGLYCERIDES      0 - 149 mg/dL 206 (H)    Non HDL Cholesterol      0 - 149 mg/dL 125    IRON      35 - 150 ug/dL 100    UIBC      110 - 370 ug/dL 243    TIBC      240 - 445 ug/dL 343    % Saturation      25 - 45 % 29    Hemoglobin A1C      See comment % 5.9 (H)    Estimated Average Glucose      Not Established mg/dL 123    Thyroid Stimulating Hormone      0.44 - 3.98 mIU/L 1.85    FERRITIN      8 - 150 ng/mL 66    FOLATE      >5.0 ng/mL 9.8    Vitamin B12      211 - 911 pg/mL 662    Vitamin D, 25-Hydroxy, Total      30 - 100 ng/mL 31    Parathyroid Hormone, Intact      18.5 - 88.0 pg/mL 106.8 (H)       Legend:  (H) High  (L) Low  Physical Exam  Neurological:      Mental Status: She is alert and oriented to person, place, and time.   Psychiatric:         Mood and Affect: Mood normal.         Behavior: Behavior normal.         Thought Content: Thought content normal.         Judgment: Judgment normal.            Assessment/Plan   Problem List Items Addressed This Visit       Class 3 severe obesity due to excess calories without serious comorbidity with body mass index (BMI) of 40.0 to 44.9 in adult    Fibromyalgia     Relevant Medications    pregabalin (Lyrica) 200 mg capsule    Vitamin D deficiency    Relevant Medications    calcium carbonate-vitamin D3 (Hi-Jose Alberto Plus Vit D) 500 mg-5 mcg (200 unit) tablet    Other Relevant Orders    Vitamin D 25-Hydroxy,Total (for eval of Vitamin D levels)    Prediabetes - Primary    Relevant Orders    CBC and Auto Differential    Comprehensive Metabolic Panel    Hemoglobin A1C    ADHD    Relevant Medications    Concerta 27 mg OSM extended release tablet    Hyperlipidemia    Relevant Orders    TSH with reflex to Free T4 if abnormal    Lipid Panel     Other Visit Diagnoses       Hyperparathyroidism (Multi)        Relevant Orders    US neck parathyroid    Comprehensive Metabolic Panel    CBC and Auto Differential    Vitamin D 25-Hydroxy,Total (for eval of Vitamin D levels)    Parathyroid Hormone, Intact    Calcium, ionized    Parathyroid Hormone, Intact        WE DISCUSSED MOST COMMON SIDE EFFECTS OF PRESCRIBED MEDICATIONS. INDICATIONS, RISK, COMPLICATIONS, AND ALTERNATIVES OF MEDICATION/THERAPEUTICS WERE EXPLAINED AND DISCUSSED. PLEASE MONITOR CLOSELY FOR ANY UNTOWARD SIDE EFFECTS OR COMPLICATIONS OF MEDICATIONS. PATIENT IS STRONGLY ADVISED TO BE COMPLIANT WITH RECOMMENDATIONS. QUESTIONS AND CONCERNS WERE ADDRESSED. INSTRUCTED TO CALL, RETURN SOONER, OR GO TO THE ER,  IF SYMPTOMS PERSIST OR WORSEN. THEY VOICED UNDERSTANDING AND  DENIES FURTHER QUESTIONS AT THIS TIME.    TIME CODE  1. PREPARATION FOR PATIENT'S VISIT (REVIEWING CHART, CURRENT MEDICAL RECORDS, OUTSIDE HEALTH PROVIDER RECORDS, PREVIOUS HISTORY, EXAM, TEST, PROCEDURE, AND MEDICATIONS)  2. FACE TO FACE ENCOUNTER OBTAINING HISTORY FROM THE PATIENT/FAMILY/CAREGIVERS; PERFORMING EVALUATION AND EXAMINATION; ORDERING TESTS OR PROCEDURES; REFERRING AND COMMUNICATING WITH OTHER HEALTHCARE PROVIDERS; COUNSELING AND EDUCATION OF THE PATIENT/FAMILY/CAREGIVERS; INDEPENDENTLY INTERPRETING RESULTS (TESTS, LABS, PROCEDURES, IMAGING) AND  COMMUNICATING AND EXPLAINING RESULTS TO THE PATIENT/FAMILY/CAREGIVERS  3. COORDINATION OF CARE; PREPARING AND PRINTING DISCHARGE INSTRUCTIONS AND ANY EDUCATIONAL MATERIAL FOR THE PATIENT/FAMILY/CAREGIVERS. DOCUMENTING CLINICAL INFORMATION IN THE ELECTRONIC MEDICAL RECORD   4. REVIEWING OARRS AS NEEDED    MDM  1) COMPLEXITY: MORE THAN 1 STABLE CHRONIC CONDITION ADDRESSED OR 1 ACUTE ILLNESS ADDRESSED   2)DATA: TESTS INTERPRETED AND OR ORDERED, TOOK INDEPENDENT HISTORY OR RECORDS REVIEWED  3)RISK: MODERATE RISK DUE TO NATURE OF MEDICAL CONDITIONS/COMORBIDITY OR MEDICATIONS ORDERED OR SURGICAL OR PROCEDURE REFERRAL      1 month MED REFILL WITH LABS

## 2024-10-18 ENCOUNTER — HOSPITAL ENCOUNTER (OUTPATIENT)
Dept: RADIOLOGY | Facility: HOSPITAL | Age: 24
Discharge: HOME | End: 2024-10-18
Payer: COMMERCIAL

## 2024-10-18 DIAGNOSIS — E21.3 HYPERPARATHYROIDISM (MULTI): ICD-10-CM

## 2024-10-18 PROCEDURE — 76536 US EXAM OF HEAD AND NECK: CPT

## 2024-10-23 ENCOUNTER — OFFICE VISIT (OUTPATIENT)
Dept: URGENT CARE | Facility: CLINIC | Age: 24
End: 2024-10-23
Payer: COMMERCIAL

## 2024-10-23 VITALS
SYSTOLIC BLOOD PRESSURE: 137 MMHG | BODY MASS INDEX: 41.82 KG/M2 | WEIGHT: 236 LBS | HEIGHT: 63 IN | DIASTOLIC BLOOD PRESSURE: 86 MMHG | RESPIRATION RATE: 18 BRPM | HEART RATE: 105 BPM | OXYGEN SATURATION: 94 % | TEMPERATURE: 97.4 F

## 2024-10-23 DIAGNOSIS — J20.9 ACUTE BRONCHITIS, UNSPECIFIED ORGANISM: Primary | ICD-10-CM

## 2024-10-23 PROCEDURE — 99213 OFFICE O/P EST LOW 20 MIN: CPT | Performed by: NURSE PRACTITIONER

## 2024-10-23 RX ORDER — AZITHROMYCIN 250 MG/1
TABLET, FILM COATED ORAL
Qty: 6 TABLET | Refills: 0 | Status: SHIPPED | OUTPATIENT
Start: 2024-10-23 | End: 2024-10-28

## 2024-10-23 RX ORDER — PREDNISONE 10 MG/1
TABLET ORAL
Qty: 21 TABLET | Refills: 0 | Status: SHIPPED | OUTPATIENT
Start: 2024-10-23 | End: 2024-10-28

## 2024-10-23 RX ORDER — ALBUTEROL SULFATE 90 UG/1
2 INHALANT RESPIRATORY (INHALATION) EVERY 6 HOURS PRN
Qty: 18 G | Refills: 0 | Status: SHIPPED | OUTPATIENT
Start: 2024-10-23 | End: 2025-10-23
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
## 2024-10-24 ENCOUNTER — APPOINTMENT (OUTPATIENT)
Dept: PRIMARY CARE | Facility: CLINIC | Age: 24
End: 2024-10-24
Payer: COMMERCIAL

## 2024-10-29 ENCOUNTER — OFFICE VISIT (OUTPATIENT)
Dept: URGENT CARE | Facility: CLINIC | Age: 24
End: 2024-10-29
Payer: COMMERCIAL

## 2024-10-29 VITALS
BODY MASS INDEX: 41.82 KG/M2 | HEART RATE: 86 BPM | HEIGHT: 63 IN | TEMPERATURE: 98.7 F | SYSTOLIC BLOOD PRESSURE: 123 MMHG | DIASTOLIC BLOOD PRESSURE: 84 MMHG | OXYGEN SATURATION: 95 % | WEIGHT: 236 LBS | RESPIRATION RATE: 16 BRPM

## 2024-10-29 DIAGNOSIS — J20.9 ACUTE BRONCHITIS, UNSPECIFIED ORGANISM: Primary | ICD-10-CM

## 2024-10-29 PROCEDURE — 99213 OFFICE O/P EST LOW 20 MIN: CPT | Performed by: NURSE PRACTITIONER

## 2024-10-29 RX ORDER — PREDNISONE 10 MG/1
TABLET ORAL
Qty: 21 TABLET | Refills: 0 | Status: SHIPPED | OUTPATIENT
Start: 2024-10-29 | End: 2024-11-03

## 2024-10-29 RX ORDER — AZITHROMYCIN 250 MG/1
TABLET, FILM COATED ORAL
Qty: 6 TABLET | Refills: 0 | Status: SHIPPED | OUTPATIENT
Start: 2024-10-29 | End: 2024-11-03

## 2024-11-06 ENCOUNTER — HOSPITAL ENCOUNTER (OUTPATIENT)
Dept: HOSPITAL 100 - PSN | Age: 24
Discharge: HOME | End: 2024-11-06
Payer: COMMERCIAL

## 2024-11-06 DIAGNOSIS — R53.1: Primary | ICD-10-CM

## 2024-11-06 DIAGNOSIS — G62.9: ICD-10-CM

## 2024-11-06 PROCEDURE — 95886 MUSC TEST DONE W/N TEST COMP: CPT

## 2024-11-06 PROCEDURE — 95913 NRV CNDJ TEST 13/> STUDIES: CPT

## 2024-11-15 ENCOUNTER — APPOINTMENT (OUTPATIENT)
Dept: PRIMARY CARE | Facility: CLINIC | Age: 24
End: 2024-11-15
Payer: COMMERCIAL

## 2024-11-15 VITALS
BODY MASS INDEX: 42.52 KG/M2 | HEART RATE: 94 BPM | HEIGHT: 63 IN | WEIGHT: 240 LBS | SYSTOLIC BLOOD PRESSURE: 117 MMHG | DIASTOLIC BLOOD PRESSURE: 88 MMHG

## 2024-11-15 DIAGNOSIS — E66.813 CLASS 3 SEVERE OBESITY DUE TO EXCESS CALORIES WITHOUT SERIOUS COMORBIDITY WITH BODY MASS INDEX (BMI) OF 40.0 TO 44.9 IN ADULT: ICD-10-CM

## 2024-11-15 DIAGNOSIS — E87.6 HYPOKALEMIA: ICD-10-CM

## 2024-11-15 DIAGNOSIS — M79.7 FIBROMYALGIA: ICD-10-CM

## 2024-11-15 DIAGNOSIS — L70.0 ACNE VULGARIS: ICD-10-CM

## 2024-11-15 DIAGNOSIS — F90.9 ATTENTION DEFICIT HYPERACTIVITY DISORDER (ADHD), UNSPECIFIED ADHD TYPE: Primary | ICD-10-CM

## 2024-11-15 DIAGNOSIS — E66.01 CLASS 3 SEVERE OBESITY DUE TO EXCESS CALORIES WITHOUT SERIOUS COMORBIDITY WITH BODY MASS INDEX (BMI) OF 40.0 TO 44.9 IN ADULT: ICD-10-CM

## 2024-11-15 DIAGNOSIS — F41.9 ANXIETY: ICD-10-CM

## 2024-11-15 DIAGNOSIS — I15.9 SECONDARY HYPERTENSION: ICD-10-CM

## 2024-11-15 DIAGNOSIS — J30.2 SEASONAL ALLERGIES: ICD-10-CM

## 2024-11-15 DIAGNOSIS — R60.0 LOCALIZED EDEMA: ICD-10-CM

## 2024-11-15 PROCEDURE — 99214 OFFICE O/P EST MOD 30 MIN: CPT | Performed by: NURSE PRACTITIONER

## 2024-11-15 PROCEDURE — 1036F TOBACCO NON-USER: CPT | Performed by: NURSE PRACTITIONER

## 2024-11-15 PROCEDURE — 3008F BODY MASS INDEX DOCD: CPT | Performed by: NURSE PRACTITIONER

## 2024-11-15 PROCEDURE — 3074F SYST BP LT 130 MM HG: CPT | Performed by: NURSE PRACTITIONER

## 2024-11-15 PROCEDURE — 3079F DIAST BP 80-89 MM HG: CPT | Performed by: NURSE PRACTITIONER

## 2024-11-15 RX ORDER — CHLORTHALIDONE 25 MG/1
25 TABLET ORAL DAILY
Qty: 30 TABLET | Refills: 5 | Status: SHIPPED | OUTPATIENT
Start: 2024-11-15 | End: 2025-05-14

## 2024-11-15 RX ORDER — MONTELUKAST SODIUM 10 MG/1
10 TABLET ORAL NIGHTLY
Qty: 30 TABLET | Refills: 5 | Status: SHIPPED | OUTPATIENT
Start: 2024-11-15

## 2024-11-15 RX ORDER — SPIRONOLACTONE 25 MG/1
25 TABLET ORAL DAILY
Qty: 30 TABLET | Refills: 5 | Status: SHIPPED | OUTPATIENT
Start: 2024-11-15 | End: 2025-05-14

## 2024-11-15 RX ORDER — METHYLPHENIDATE HYDROCHLORIDE 27 MG/1
27 TABLET, EXTENDED RELEASE ORAL EVERY MORNING
Qty: 30 TABLET | Refills: 0 | Status: SHIPPED | OUTPATIENT
Start: 2024-11-15 | End: 2024-12-15

## 2024-11-15 RX ORDER — DULOXETIN HYDROCHLORIDE 60 MG/1
60 CAPSULE, DELAYED RELEASE ORAL DAILY
Qty: 30 CAPSULE | Refills: 3 | Status: SHIPPED | OUTPATIENT
Start: 2024-11-15

## 2024-11-15 RX ORDER — PREGABALIN 200 MG/1
200 CAPSULE ORAL 2 TIMES DAILY
Qty: 60 CAPSULE | Refills: 1 | Status: SHIPPED | OUTPATIENT
Start: 2024-11-15

## 2024-11-15 RX ORDER — POTASSIUM CHLORIDE 20 MEQ/1
20 TABLET, EXTENDED RELEASE ORAL 2 TIMES DAILY
Qty: 30 TABLET | Refills: 11 | Status: SHIPPED | OUTPATIENT
Start: 2024-11-15

## 2024-11-15 RX ORDER — METHOCARBAMOL 500 MG/1
500 TABLET, FILM COATED ORAL 4 TIMES DAILY PRN
Qty: 120 TABLET | Refills: 2 | Status: SHIPPED | OUTPATIENT
Start: 2024-11-15

## 2024-11-15 ASSESSMENT — ENCOUNTER SYMPTOMS
NERVOUS/ANXIOUS: 0
SPEECH DIFFICULTY: 0
ABDOMINAL PAIN: 0
EYE PAIN: 0
PHOTOPHOBIA: 0
SORE THROAT: 0
DIFFICULTY URINATING: 0
FACIAL ASYMMETRY: 0
SLEEP DISTURBANCE: 0
CHEST TIGHTNESS: 0
FEVER: 0
NECK PAIN: 0
CHILLS: 0
CHOKING: 0
NUMBNESS: 0
WEAKNESS: 0
ARTHRALGIAS: 0
TROUBLE SWALLOWING: 0
UNEXPECTED WEIGHT CHANGE: 0
SEIZURES: 0
COUGH: 0
FLANK PAIN: 0
DYSURIA: 0
EYE REDNESS: 0
JOINT SWELLING: 0
DIARRHEA: 0
MYALGIAS: 0
FATIGUE: 0
DIZZINESS: 0
NAUSEA: 0
CONSTIPATION: 0
WOUND: 0
BACK PAIN: 0
ABDOMINAL DISTENTION: 0
HEMATURIA: 0
SHORTNESS OF BREATH: 0
PALPITATIONS: 0
BLOOD IN STOOL: 0
APNEA: 0
VOMITING: 0
WHEEZING: 0
HEADACHES: 0
CONFUSION: 0
FREQUENCY: 0

## 2024-11-15 NOTE — PROGRESS NOTES
"Subjective   Patient ID: Mable Carrillo \"Jaqueline\" is a 24 y.o. female who presents for Follow-up (ONE MONTH MED CHECK).      HPI:  Presents today for MED REFILL OF LYRICA WHICH SHE TAKES FOR FIBRO. SHE IS DOING WELL ON MED AND DENIES SIDE EFFECTS.      MED REFILL OF CONCERTA WHICH SHE TAKES FOR ADD. SHE IS DOING WELL ON MED AND DENIES SIDE EFFECTS.     NO NEW COMPLAINTS     SYNCOPE- SEEN CARDIO. FOLLOWING WITH [facility] TO FIND CAUSE. EMG DONE BUT RESULTS PENDING.     APPT WITH ENDO NEXT WEEK THROUGH [facility] IN [place]           OARRS:  Zainab Gomez, APRN-CNP on 11/15/2024  8:46 AM  I have personally reviewed the OARRS report for Mable Carrillo. I have considered the risks of abuse, dependence, addiction and diversion and I believe that it is clinically appropriate for Mable Carrillo to be prescribed this medication    Is the patient prescribed a combination of a benzodiazepine and opioid?  No    Last Urine Drug Screen / ordered today: No  Recent Results (from the past 8760 hours)   Confirmation Opiate/Opioid/Benzo Prescription Compliance    Collection Time: 09/24/24  2:06 PM   Result Value Ref Range    Clonazepam <25 <25 ng/mL    7-Aminoclonazepam <25 <25 ng/mL    Alprazolam <25 <25 ng/mL    Alpha-Hydroxyalprazolam <25 <25 ng/mL    Midazolam <25 <25 ng/mL    Alpha-Hydroxymidazolam <25 <25 ng/mL    Chlordiazepoxide <25 <25 ng/mL    Diazepam <25 <25 ng/mL    Nordiazepam <25 <25 ng/mL    Temazepam <25 <25 ng/mL    Oxazepam <25 <25 ng/mL    Lorazepam <25 <25 ng/mL    Methadone <25 <25 ng/mL    EDDP <25 <25 ng/mL    6-Acetylmorphine <25 <25 ng/mL    Codeine <50 <50 ng/mL    Hydrocodone <25 <25 ng/mL    Hydromorphone <25 <25 ng/mL    Morphine  <50 <50 ng/mL    Norhydrocodone <25 <25 ng/mL    Noroxycodone <25 <25 ng/mL    Oxycodone <25 <25 ng/mL    Oxymorphone <25 <25 ng/mL    Fentanyl <2.5 <2.5 ng/mL    Norfentanyl <2.5 <2.5 ng/mL    Tramadol <50 <50 " "ng/mL    O-Desmethyltramadol <50 <50 ng/mL    Zolpidem <25 <25 ng/mL    Zolpidem Metabolite (ZCA) <25 <25 ng/mL   Screen Opiate/Opioid/Benzo Prescription Compliance    Collection Time: 09/24/24  2:05 PM   Result Value Ref Range    Creatinine, Urine Random 79.6 20.0 - 320.0 mg/dL    Amphetamine Screen, Urine Presumptive Negative Presumptive Negative    Barbiturate Screen, Urine Presumptive Negative Presumptive Negative    Cannabinoid Screen, Urine Presumptive Negative Presumptive Negative    Cocaine Metabolite Screen, Urine Presumptive Negative Presumptive Negative    PCP Screen, Urine Presumptive Negative Presumptive Negative     Results are as expected.         Controlled Substance Agreement:  Date of the Last Agreement: 09/24/2024  Reviewed Controlled Substance Agreement including but not limited to the benefits, risks, and alternatives to treatment with a Controlled Substance medication(s).      Visit Vitals  /88   Pulse 94   Ht 1.6 m (5' 3\")   Wt 109 kg (240 lb)   LMP 10/09/2024   BMI 42.51 kg/m²   OB Status Having periods   Smoking Status Never   BSA 2.2 m²        Review of Systems   Constitutional:  Negative for chills, fatigue, fever and unexpected weight change.   HENT:  Negative for congestion, ear pain, sore throat and trouble swallowing.    Eyes:  Negative for photophobia, pain, redness and visual disturbance.   Respiratory:  Negative for apnea, cough, choking, chest tightness, shortness of breath and wheezing.    Cardiovascular:  Negative for chest pain, palpitations and leg swelling.   Gastrointestinal:  Negative for abdominal distention, abdominal pain, blood in stool, constipation, diarrhea, nausea and vomiting.   Genitourinary:  Negative for difficulty urinating, dysuria, flank pain, frequency, hematuria and urgency.   Musculoskeletal:  Negative for arthralgias, back pain, gait problem, joint swelling, myalgias and neck pain.   Skin:  Negative for rash and wound.   Neurological:  Negative for " dizziness, seizures, syncope, facial asymmetry, speech difficulty, weakness, numbness and headaches.   Psychiatric/Behavioral:  Negative for confusion, sleep disturbance and suicidal ideas. The patient is not nervous/anxious.        Objective     Physical Exam  Constitutional:       Appearance: Normal appearance. She is normal weight.   HENT:      Head: Normocephalic.   Eyes:      Extraocular Movements: Extraocular movements intact.      Conjunctiva/sclera: Conjunctivae normal.      Pupils: Pupils are equal, round, and reactive to light.   Cardiovascular:      Rate and Rhythm: Normal rate and regular rhythm.      Pulses: Normal pulses.      Heart sounds: Normal heart sounds.   Pulmonary:      Effort: Pulmonary effort is normal.      Breath sounds: Normal breath sounds.   Musculoskeletal:         General: Normal range of motion.      Cervical back: Normal range of motion.   Skin:     General: Skin is warm and dry.   Neurological:      General: No focal deficit present.      Mental Status: She is alert and oriented to person, place, and time.   Psychiatric:         Mood and Affect: Mood normal.         Behavior: Behavior normal.         Thought Content: Thought content normal.         Judgment: Judgment normal.            Assessment/Plan   Problem List Items Addressed This Visit       Anxiety    Relevant Medications    DULoxetine (Cymbalta) 60 mg DR capsule    Class 3 severe obesity due to excess calories without serious comorbidity with body mass index (BMI) of 40.0 to 44.9 in adult    Fibromyalgia    Relevant Medications    methocarbamol (Robaxin) 500 mg tablet    pregabalin (Lyrica) 200 mg capsule    Secondary hypertension    Relevant Medications    chlorthalidone (Hygroton) 25 mg tablet    Hypokalemia    Relevant Medications    potassium chloride CR 20 mEq ER tablet    ADHD - Primary    Relevant Medications    Concerta 27 mg OSM extended release tablet     Other Visit Diagnoses       Seasonal allergies         Relevant Medications    montelukast (Singulair) 10 mg tablet    Localized edema        Relevant Medications    spironolactone (Aldactone) 25 mg tablet    Acne vulgaris        Relevant Medications    spironolactone (Aldactone) 25 mg tablet          WE DISCUSSED MOST COMMON SIDE EFFECTS OF PRESCRIBED MEDICATIONS. INDICATIONS, RISK, COMPLICATIONS, AND ALTERNATIVES OF MEDICATION/THERAPEUTICS WERE EXPLAINED AND DISCUSSED. PLEASE MONITOR CLOSELY FOR ANY UNTOWARD SIDE EFFECTS OR COMPLICATIONS OF MEDICATIONS. PATIENT IS STRONGLY ADVISED TO BE COMPLIANT WITH RECOMMENDATIONS. QUESTIONS AND CONCERNS WERE ADDRESSED. INSTRUCTED TO CALL, RETURN SOONER, OR GO TO THE ER,  IF SYMPTOMS PERSIST OR WORSEN. THEY VOICED UNDERSTANDING AND  DENIES FURTHER QUESTIONS AT THIS TIME.    TIME CODE  1. PREPARATION FOR PATIENT'S VISIT (REVIEWING CHART, CURRENT MEDICAL RECORDS, OUTSIDE HEALTH PROVIDER RECORDS, PREVIOUS HISTORY, EXAM, TEST, PROCEDURE, AND MEDICATIONS)  2. FACE TO FACE ENCOUNTER OBTAINING HISTORY FROM THE PATIENT/FAMILY/CAREGIVERS; PERFORMING EVALUATION AND EXAMINATION; ORDERING TESTS OR PROCEDURES; REFERRING AND COMMUNICATING WITH OTHER HEALTHCARE PROVIDERS; COUNSELING AND EDUCATION OF THE PATIENT/FAMILY/CAREGIVERS; INDEPENDENTLY INTERPRETING RESULTS (TESTS, LABS, PROCEDURES, IMAGING) AND COMMUNICATING AND EXPLAINING RESULTS TO THE PATIENT/FAMILY/CAREGIVERS  3. COORDINATION OF CARE; PREPARING AND PRINTING DISCHARGE INSTRUCTIONS AND ANY EDUCATIONAL MATERIAL FOR THE PATIENT/FAMILY/CAREGIVERS. DOCUMENTING CLINICAL INFORMATION IN THE ELECTRONIC MEDICAL RECORD   4. REVIEWING OARRS AS NEEDED    MDM  1) COMPLEXITY: MORE THAN 1 STABLE CHRONIC CONDITION ADDRESSED OR 1 ACUTE ILLNESS ADDRESSED   2)DATA: TESTS INTERPRETED AND OR ORDERED, TOOK INDEPENDENT HISTORY OR RECORDS REVIEWED  3)RISK: MODERATE RISK DUE TO NATURE OF MEDICAL CONDITIONS/COMORBIDITY OR MEDICATIONS ORDERED OR SURGICAL OR PROCEDURE REFERRAL    3 months with labs

## 2024-11-19 ENCOUNTER — TELEPHONE (OUTPATIENT)
Dept: PRIMARY CARE | Facility: CLINIC | Age: 24
End: 2024-11-19
Payer: COMMERCIAL

## 2024-11-20 ENCOUNTER — TELEMEDICINE (OUTPATIENT)
Dept: PRIMARY CARE | Facility: CLINIC | Age: 24
End: 2024-11-20
Payer: COMMERCIAL

## 2024-11-20 VITALS — WEIGHT: 240 LBS | BODY MASS INDEX: 42.52 KG/M2 | HEIGHT: 63 IN

## 2024-11-20 DIAGNOSIS — E66.813 CLASS 3 SEVERE OBESITY DUE TO EXCESS CALORIES WITHOUT SERIOUS COMORBIDITY WITH BODY MASS INDEX (BMI) OF 40.0 TO 44.9 IN ADULT: ICD-10-CM

## 2024-11-20 DIAGNOSIS — F32.1 CURRENT MODERATE EPISODE OF MAJOR DEPRESSIVE DISORDER WITHOUT PRIOR EPISODE (MULTI): ICD-10-CM

## 2024-11-20 DIAGNOSIS — E66.01 CLASS 3 SEVERE OBESITY DUE TO EXCESS CALORIES WITHOUT SERIOUS COMORBIDITY WITH BODY MASS INDEX (BMI) OF 40.0 TO 44.9 IN ADULT: ICD-10-CM

## 2024-11-20 DIAGNOSIS — E87.6 HYPOKALEMIA: Primary | ICD-10-CM

## 2024-11-20 PROCEDURE — 1036F TOBACCO NON-USER: CPT | Performed by: NURSE PRACTITIONER

## 2024-11-20 PROCEDURE — 99214 OFFICE O/P EST MOD 30 MIN: CPT | Performed by: NURSE PRACTITIONER

## 2024-11-20 PROCEDURE — 3008F BODY MASS INDEX DOCD: CPT | Performed by: NURSE PRACTITIONER

## 2024-11-20 ASSESSMENT — ENCOUNTER SYMPTOMS
EYE REDNESS: 0
NUMBNESS: 0
SHORTNESS OF BREATH: 0
HEADACHES: 0
FREQUENCY: 0
UNEXPECTED WEIGHT CHANGE: 0
SPEECH DIFFICULTY: 0
BLOOD IN STOOL: 0
COUGH: 0
APNEA: 0
WOUND: 0
BACK PAIN: 0
DIFFICULTY URINATING: 0
DIZZINESS: 0
SORE THROAT: 0
WHEEZING: 0
FACIAL ASYMMETRY: 0
CONSTIPATION: 0
CHOKING: 0
MYALGIAS: 0
ABDOMINAL PAIN: 0
EYE PAIN: 0
CHILLS: 0
TROUBLE SWALLOWING: 0
SLEEP DISTURBANCE: 0
FATIGUE: 0
ABDOMINAL DISTENTION: 0
ARTHRALGIAS: 0
NERVOUS/ANXIOUS: 0
FEVER: 0
NECK PAIN: 0
HEMATURIA: 0
CHEST TIGHTNESS: 0
WEAKNESS: 0
PALPITATIONS: 0
CONFUSION: 0
FLANK PAIN: 0
DYSURIA: 0
DIARRHEA: 0
PHOTOPHOBIA: 0
JOINT SWELLING: 0
VOMITING: 0
SEIZURES: 0
NAUSEA: 0

## 2024-11-20 ASSESSMENT — PATIENT HEALTH QUESTIONNAIRE - PHQ9
4. FEELING TIRED OR HAVING LITTLE ENERGY: MORE THAN HALF THE DAYS
2. FEELING DOWN, DEPRESSED OR HOPELESS: MORE THAN HALF THE DAYS
1. LITTLE INTEREST OR PLEASURE IN DOING THINGS: MORE THAN HALF THE DAYS
SUM OF ALL RESPONSES TO PHQ9 QUESTIONS 1 AND 2: 4
8. MOVING OR SPEAKING SO SLOWLY THAT OTHER PEOPLE COULD HAVE NOTICED. OR THE OPPOSITE, BEING SO FIGETY OR RESTLESS THAT YOU HAVE BEEN MOVING AROUND A LOT MORE THAN USUAL: NOT AT ALL
SUM OF ALL RESPONSES TO PHQ QUESTIONS 1-9: 12
5. POOR APPETITE OR OVEREATING: MORE THAN HALF THE DAYS
3. TROUBLE FALLING OR STAYING ASLEEP OR SLEEPING TOO MUCH: MORE THAN HALF THE DAYS
7. TROUBLE CONCENTRATING ON THINGS, SUCH AS READING THE NEWSPAPER OR WATCHING TELEVISION: MORE THAN HALF THE DAYS
9. THOUGHTS THAT YOU WOULD BE BETTER OFF DEAD, OR OF HURTING YOURSELF: NOT AT ALL
10. IF YOU CHECKED OFF ANY PROBLEMS, HOW DIFFICULT HAVE THESE PROBLEMS MADE IT FOR YOU TO DO YOUR WORK, TAKE CARE OF THINGS AT HOME, OR GET ALONG WITH OTHER PEOPLE: VERY DIFFICULT
6. FEELING BAD ABOUT YOURSELF - OR THAT YOU ARE A FAILURE OR HAVE LET YOURSELF OR YOUR FAMILY DOWN: NOT AT ALL

## 2024-11-20 NOTE — PROGRESS NOTES
"Subjective   Patient ID: Mable Carrillo \"Jaqueline\" is a 24 y.o. female who presents for Follow-up (DISCUSS ENDO APPT ).    Virtual or Telephone Consent    An interactive audio and video telecommunication system which permits real time communications between the patient (at the originating site) and provider (at the distant site) was utilized to provide this telehealth service.   Verbal consent was requested and obtained from Mable Carrillo on this date, 11/20/24 for a telehealth visit.     HPI:  Presents today for ENDO WANTS HER TO STOP CHLORTHALIDONE X 6-8  WEEKS R/T ELEVATED PTH. SHE HAS BEEN ON THIS FOR EDEMA ABOUT 1 YEAR. NO FURTHER COMPLAINTS    DEPRESSION- POSITIVE PHQ9 BUT STATES IT IS R/T HEALTH. DENIES SUICIDAL IDEATION. REFUSING MED MANAGEMENT     Visit Vitals  Ht 1.6 m (5' 3\")   Wt 109 kg (240 lb)   LMP 10/09/2024   BMI 42.51 kg/m²   OB Status Having periods   Smoking Status Never   BSA 2.2 m²        Review of Systems   Constitutional:  Negative for chills, fatigue, fever and unexpected weight change.   HENT:  Negative for congestion, ear pain, sore throat and trouble swallowing.    Eyes:  Negative for photophobia, pain, redness and visual disturbance.   Respiratory:  Negative for apnea, cough, choking, chest tightness, shortness of breath and wheezing.    Cardiovascular:  Negative for chest pain, palpitations and leg swelling.   Gastrointestinal:  Negative for abdominal distention, abdominal pain, blood in stool, constipation, diarrhea, nausea and vomiting.   Genitourinary:  Negative for difficulty urinating, dysuria, flank pain, frequency, hematuria and urgency.   Musculoskeletal:  Negative for arthralgias, back pain, gait problem, joint swelling, myalgias and neck pain.   Skin:  Negative for rash and wound.   Neurological:  Negative for dizziness, seizures, syncope, facial asymmetry, speech difficulty, weakness, numbness and headaches.   Psychiatric/Behavioral:  Negative for " confusion, sleep disturbance and suicidal ideas. The patient is not nervous/anxious.        Objective     Physical Exam  Neurological:      Mental Status: She is alert and oriented to person, place, and time.   Psychiatric:         Mood and Affect: Mood normal.         Behavior: Behavior normal.         Thought Content: Thought content normal.         Judgment: Judgment normal.            Assessment/Plan   Problem List Items Addressed This Visit       Class 3 severe obesity due to excess calories without serious comorbidity with body mass index (BMI) of 40.0 to 44.9 in adult    Current moderate episode of major depressive disorder without prior episode (Multi)    Hypokalemia - Primary    Relevant Orders    Potassium   STOP POTASSIUM WITH CHLORTHALIDONE. IF EDEMA RETURNS, INCREASE SPIROLACTONE. DO K+ LEVEL IN 2 WEEKS. I HAVE AGREED TO CALL WITH RESULTS     WE DISCUSSED MOST COMMON SIDE EFFECTS OF PRESCRIBED MEDICATIONS. INDICATIONS, RISK, COMPLICATIONS, AND ALTERNATIVES OF MEDICATION/THERAPEUTICS WERE EXPLAINED AND DISCUSSED. PLEASE MONITOR CLOSELY FOR ANY UNTOWARD SIDE EFFECTS OR COMPLICATIONS OF MEDICATIONS. PATIENT IS STRONGLY ADVISED TO BE COMPLIANT WITH RECOMMENDATIONS. QUESTIONS AND CONCERNS WERE ADDRESSED. INSTRUCTED TO CALL, RETURN SOONER, OR GO TO THE ER,  IF SYMPTOMS PERSIST OR WORSEN. THEY VOICED UNDERSTANDING AND  DENIES FURTHER QUESTIONS AT THIS TIME.    TIME CODE  1. PREPARATION FOR PATIENT'S VISIT (REVIEWING CHART, CURRENT MEDICAL RECORDS, OUTSIDE HEALTH PROVIDER RECORDS, PREVIOUS HISTORY, EXAM, TEST, PROCEDURE, AND MEDICATIONS)  2. FACE TO FACE ENCOUNTER OBTAINING HISTORY FROM THE PATIENT/FAMILY/CAREGIVERS; PERFORMING EVALUATION AND EXAMINATION; ORDERING TESTS OR PROCEDURES; REFERRING AND COMMUNICATING WITH OTHER HEALTHCARE PROVIDERS; COUNSELING AND EDUCATION OF THE PATIENT/FAMILY/CAREGIVERS; INDEPENDENTLY INTERPRETING RESULTS (TESTS, LABS, PROCEDURES, IMAGING) AND COMMUNICATING AND EXPLAINING RESULTS  TO THE PATIENT/FAMILY/CAREGIVERS  3. COORDINATION OF CARE; PREPARING AND PRINTING DISCHARGE INSTRUCTIONS AND ANY EDUCATIONAL MATERIAL FOR THE PATIENT/FAMILY/CAREGIVERS. DOCUMENTING CLINICAL INFORMATION IN THE ELECTRONIC MEDICAL RECORD   4. REVIEWING OARRS AS NEEDED    MDM  1) COMPLEXITY: MORE THAN 1 STABLE CHRONIC CONDITION ADDRESSED OR 1 ACUTE ILLNESS ADDRESSED   2)DATA: TESTS INTERPRETED AND OR ORDERED, TOOK INDEPENDENT HISTORY OR RECORDS REVIEWED  3)RISK: MODERATE RISK DUE TO NATURE OF MEDICAL CONDITIONS/COMORBIDITY OR MEDICATIONS ORDERED OR SURGICAL OR PROCEDURE REFERRAL    Follow up as before

## 2024-11-22 ENCOUNTER — APPOINTMENT (OUTPATIENT)
Dept: CARDIOLOGY | Facility: CLINIC | Age: 24
End: 2024-11-22
Payer: COMMERCIAL

## 2024-12-12 ENCOUNTER — TELEMEDICINE (OUTPATIENT)
Dept: PRIMARY CARE | Facility: CLINIC | Age: 24
End: 2024-12-12
Payer: COMMERCIAL

## 2024-12-12 DIAGNOSIS — N93.9 EPISODE OF HEAVY VAGINAL BLEEDING: Primary | ICD-10-CM

## 2024-12-12 PROCEDURE — 99214 OFFICE O/P EST MOD 30 MIN: CPT | Performed by: FAMILY MEDICINE

## 2024-12-12 PROCEDURE — 1036F TOBACCO NON-USER: CPT | Performed by: FAMILY MEDICINE

## 2024-12-12 ASSESSMENT — ENCOUNTER SYMPTOMS
FATIGUE: 1
WEAKNESS: 0
SORE THROAT: 0
DIZZINESS: 0
LIGHT-HEADEDNESS: 1
HEADACHES: 0
SINUS PRESSURE: 0
FEVER: 0
RHINORRHEA: 0
SHORTNESS OF BREATH: 0
DIAPHORESIS: 0
ABDOMINAL PAIN: 0
FLANK PAIN: 0
HEMATURIA: 0
UNEXPECTED WEIGHT CHANGE: 0
BACK PAIN: 0
DIARRHEA: 0
APPETITE CHANGE: 1
VOICE CHANGE: 0
DYSURIA: 0
CHEST TIGHTNESS: 0
ANOREXIA: 0
CHILLS: 0
ACTIVITY CHANGE: 0
DIFFICULTY URINATING: 0
POLYPHAGIA: 0
MYALGIAS: 0
POLYDIPSIA: 0
VOMITING: 0
FREQUENCY: 0
PSYCHIATRIC NEGATIVE: 1
TROUBLE SWALLOWING: 0
CONSTIPATION: 0

## 2024-12-13 NOTE — PROGRESS NOTES
Subjective   Patient ID: Jaqueline Carrillo is a 24 y.o. female who presents for long and heavy periods.    An interactive audio and video telecommunication system which permits real time communications between the patient (at the originating site) and provider (at the distant site) was utilized to provide this telehealth service. At the start of the visit, I introduced myself as the nurse practitioner for their visit today, Libby DA SILVA. I then verified the patients name, , and current physical location. Patient confirmed their current location was IN THE [facility]. If they were currently outside of the [facility], the call was terminated and the patient was directed to alternative means of care. The patient was made aware of the limitations of the telehealth visit. They will not be physically examined and all issues may not be appropriate for a telehealth visit. If at any time this provider felt the visit was not appropriate for a video visit or more advanced care was necessary, the call would be terminated and the patient would be advised to seek proper, in person, medical attention. Pt verbalizes understanding and agrees to continue with televisit.     Pt presents with c/o very heavy periods lasting a long time. Having periods lasting weeks. Bleeding through multiple tampons. Having to sleep on a towel. Waking to change tampon a few times in the night. Only stopped bleeding for a couple days prior to restarting again. Has apt with OB/GYN in January. Reports large blood clots. Using ultra absorption tampons. Using 2-3 in a 4 hour period. Sexually active with one partner. Taking COCs. Does not take placebo week. Has been on this rx and dose for years. May have changed . No children. Never pregnant. Minimal cramping. Bright red blood. Currently being worked up for POTS per pt. Denies LOC. C/o  increased fatigue, brittle nails, dizzy at times, decreased appetite, not sleeping well,  papillations. Hx of thyroid. Denies lump/swelling in neck,     Vaginal Bleeding  The patient's primary symptoms include vaginal bleeding. The patient's pertinent negatives include no genital itching, genital lesions, genital odor, genital rash, missed menses, pelvic pain or vaginal discharge. This is a recurrent problem. The current episode started more than 1 month ago. She is not pregnant. Pertinent negatives include no abdominal pain, anorexia, back pain, chills, constipation, diarrhea, discolored urine, dysuria, fever, flank pain, frequency, headaches, hematuria, rash, sore throat, urgency or vomiting. The vaginal discharge was normal. The vaginal bleeding is heavier than menses. She has been passing clots. The symptoms are aggravated by activity. She has tried acetaminophen and NSAIDs for the symptoms. The treatment provided no relief. She is sexually active. She uses oral contraceptives for contraception. Her menstrual history has been regular. There is no history of a  section, an ectopic pregnancy, endometriosis, a gynecological surgery, miscarriage, ovarian cysts or a terminated pregnancy.        Review of Systems   Constitutional:  Positive for appetite change and fatigue. Negative for activity change, chills, diaphoresis, fever and unexpected weight change.   HENT:  Negative for congestion, rhinorrhea, sinus pressure, sore throat, trouble swallowing and voice change.    Eyes:  Negative for visual disturbance.   Respiratory:  Negative for chest tightness and shortness of breath.    Cardiovascular:  Negative for chest pain.   Gastrointestinal:  Negative for abdominal pain, anorexia, constipation, diarrhea and vomiting.   Endocrine: Negative for cold intolerance, heat intolerance, polydipsia, polyphagia and polyuria.   Genitourinary:  Positive for menstrual problem and vaginal bleeding. Negative for decreased urine volume, difficulty urinating, dysuria, enuresis, flank pain, frequency, genital sores,  hematuria, missed menses, pelvic pain, urgency, vaginal discharge and vaginal pain.   Musculoskeletal:  Negative for back pain and myalgias.   Skin:  Negative for rash.   Neurological:  Positive for light-headedness. Negative for dizziness, syncope, weakness and headaches.   Psychiatric/Behavioral: Negative.         Objective   There were no vitals taken for this visit.    Physical Exam  Vitals and nursing note reviewed.   Constitutional:       General: She is not in acute distress.  Eyes:      Conjunctiva/sclera: Conjunctivae normal.   Neurological:      General: No focal deficit present.      Mental Status: She is alert. Mental status is at baseline.   Psychiatric:         Mood and Affect: Mood normal.         Behavior: Behavior normal.         Thought Content: Thought content normal.         Judgment: Judgment normal.       physical exam limited d/t video visit.   Assessment/Plan   Diagnoses and all orders for this visit:  Episode of heavy vaginal bleeding  -     CBC and Auto Differential; Future  -     Ferritin; Future  -     HCG, quantitative, pregnancy; Future  -     TSH with reflex to Free T4 if abnormal; Future  -     PTH, intact; Future    -pt encouraged to call GYN/PCP office in the morning to see if she can get sooner apt. If unable to pt encouraged to go to ED.  -labs ordered to assess bleeding  -go to ED for Light-headed, dizziness, CP, SOB, papilations, severe fatigue, bleeding does not slow in 2-3 days.     The total time spent on this visit was 30 min. This time includes, but is not limited to, reviewing the patient's history, labs, test results, allergies, current medications, interviewing, assessing, examining, diagnosing, counseling, education, placing orders, documenting and care coordination.

## 2024-12-16 ENCOUNTER — LAB (OUTPATIENT)
Dept: LAB | Facility: LAB | Age: 24
End: 2024-12-16
Payer: COMMERCIAL

## 2024-12-16 DIAGNOSIS — N93.9 EPISODE OF HEAVY VAGINAL BLEEDING: ICD-10-CM

## 2024-12-16 LAB
B-HCG SERPL-ACNC: <2 MIU/ML
BASOPHILS # BLD AUTO: 0.09 X10*3/UL (ref 0–0.1)
BASOPHILS NFR BLD AUTO: 1 %
EOSINOPHIL # BLD AUTO: 0.22 X10*3/UL (ref 0–0.7)
EOSINOPHIL NFR BLD AUTO: 2.4 %
ERYTHROCYTE [DISTWIDTH] IN BLOOD BY AUTOMATED COUNT: 13.7 % (ref 11.5–14.5)
FERRITIN SERPL-MCNC: 39 NG/ML (ref 8–150)
HCT VFR BLD AUTO: 43.8 % (ref 36–46)
HGB BLD-MCNC: 14 G/DL (ref 12–16)
IMM GRANULOCYTES # BLD AUTO: 0.02 X10*3/UL (ref 0–0.7)
IMM GRANULOCYTES NFR BLD AUTO: 0.2 % (ref 0–0.9)
LYMPHOCYTES # BLD AUTO: 2.62 X10*3/UL (ref 1.2–4.8)
LYMPHOCYTES NFR BLD AUTO: 29 %
MCH RBC QN AUTO: 27.6 PG (ref 26–34)
MCHC RBC AUTO-ENTMCNC: 32 G/DL (ref 32–36)
MCV RBC AUTO: 86 FL (ref 80–100)
MONOCYTES # BLD AUTO: 0.69 X10*3/UL (ref 0.1–1)
MONOCYTES NFR BLD AUTO: 7.6 %
NEUTROPHILS # BLD AUTO: 5.41 X10*3/UL (ref 1.2–7.7)
NEUTROPHILS NFR BLD AUTO: 59.8 %
NRBC BLD-RTO: 0 /100 WBCS (ref 0–0)
PLATELET # BLD AUTO: 502 X10*3/UL (ref 150–450)
RBC # BLD AUTO: 5.07 X10*6/UL (ref 4–5.2)
TSH SERPL-ACNC: 2.43 MIU/L (ref 0.44–3.98)
WBC # BLD AUTO: 9.1 X10*3/UL (ref 4.4–11.3)

## 2024-12-16 PROCEDURE — 84443 ASSAY THYROID STIM HORMONE: CPT

## 2024-12-16 PROCEDURE — 84702 CHORIONIC GONADOTROPIN TEST: CPT

## 2024-12-16 PROCEDURE — 85025 COMPLETE CBC W/AUTO DIFF WBC: CPT

## 2024-12-16 PROCEDURE — 82728 ASSAY OF FERRITIN: CPT

## 2024-12-16 PROCEDURE — 36415 COLL VENOUS BLD VENIPUNCTURE: CPT

## 2024-12-16 PROCEDURE — 83970 ASSAY OF PARATHORMONE: CPT

## 2024-12-17 LAB — PTH-INTACT SERPL-MCNC: 116.6 PG/ML (ref 18.5–88)

## 2025-01-04 ENCOUNTER — TELEMEDICINE (OUTPATIENT)
Dept: PRIMARY CARE | Facility: CLINIC | Age: 25
End: 2025-01-04
Payer: COMMERCIAL

## 2025-01-04 DIAGNOSIS — A08.4 VIRAL GASTROENTERITIS: Primary | ICD-10-CM

## 2025-01-04 PROCEDURE — 99213 OFFICE O/P EST LOW 20 MIN: CPT | Performed by: NURSE PRACTITIONER

## 2025-01-04 RX ORDER — ONDANSETRON 4 MG/1
4 TABLET, FILM COATED ORAL EVERY 12 HOURS PRN
Qty: 6 TABLET | Refills: 0 | Status: SHIPPED | OUTPATIENT
Start: 2025-01-04 | End: 2025-01-10

## 2025-01-04 ASSESSMENT — ENCOUNTER SYMPTOMS
HEADACHES: 0
APPETITE CHANGE: 0
NAUSEA: 1
DIARRHEA: 1
CHILLS: 0
WEIGHT LOSS: 0
SWEATS: 0
FLATUS: 0
SHORTNESS OF BREATH: 0
MYALGIAS: 0
VOMITING: 0
FEVER: 0
COUGH: 0
DIZZINESS: 0
LIGHT-HEADEDNESS: 0
FATIGUE: 0
ACTIVITY CHANGE: 0
ABDOMINAL PAIN: 1

## 2025-01-04 NOTE — PATIENT INSTRUCTIONS
Pleasure meeting with you today!    Let me know if you need anything.     Please send me a MyChart message if you have any questions or concerns.  FOR NON URGENT questions only.  Allow up to 72 hours for response.     If you have prescription issues or other questions you can email   Sheri Benavides,  Digital Health Coordinator, at   [email or web address]

## 2025-01-04 NOTE — LETTER
January 4, 2025     Patient: Mable Carrillo   YOB: 2000   Date of Visit: 1/4/2025       To Whom It May Concern:    Mable Carrillo was seen in my clinic on 1/4/2025 at 9:30 am. Please excuse Mable for her absence from work on 01/04/2024 through 01/05/2024 due to illness.     If you have any questions or concerns, please don't hesitate to call.         Sincerely,         Rafy Branch, APRN-CNP        CC: No Recipients

## 2025-01-04 NOTE — PROGRESS NOTES
"Subjective   Patient ID: Jaqueline Carrillo is a 24 y.o. female who presents for Diarrhea (Sx onset yesterday).      \"Symptoms started yesterday morning at 7am (diarrhea and incontinence) amd I haven't had solid stool since. My stomach is distended and tender upon palpation in all 4 quadrants; abdominal lymph nodes are swollen. Yesterday evening I started having severe abdominal cramping when changing position. At 1am today my fiancé had sudden onset of the same symptoms severe enough that we had to call 911 and have him seen in the ER\"    Episodes of diarrhea in the last 24 hours, 10 today       Diarrhea   This is a new problem. The current episode started yesterday. The problem occurs more than 10 times per day. The patient states that diarrhea awakens her from sleep. Associated symptoms include abdominal pain. Pertinent negatives include no chills, coughing, fever, headaches, increased  flatus, myalgias, sweats, URI, vomiting or weight loss. Nothing aggravates the symptoms. Risk factors include ill contacts. She has tried nothing for the symptoms.        Review of Systems   Constitutional:  Negative for activity change, appetite change, chills, fatigue, fever and weight loss.   Respiratory:  Negative for cough and shortness of breath.    Cardiovascular:  Negative for chest pain.   Gastrointestinal:  Positive for abdominal pain, diarrhea and nausea. Negative for flatus and vomiting.   Genitourinary:  Negative for decreased urine volume.   Musculoskeletal:  Negative for myalgias.   Neurological:  Negative for dizziness, light-headedness and headaches.       Objective   There were no vitals taken for this visit.    Physical Exam  Constitutional:       General: She is not in acute distress.     Appearance: Normal appearance. She is obese. She is not ill-appearing.      Comments: On Demand Virtual Visit Patient Consent     An interactive audio and video telecommunication system which permits real time " communications between the patient (at the originating site) and provider (at the distant site) was utilized to provide this telehealth service.   Verbal consent was requested and obtained from Mable Carrillo (or parent if under 18) on this date, 24 for a telehealth visit.   I have verbally confirmed with Mable Carrillo (or parent if under 18) that they are physically located in the [facility] during this virtual visit.    I performed this visit using realtime telehealth tools, including an audio/video OR telephone connection between the patient listed who was located in the [facility] and myself, Rfay Branch CNP (licensed in the [facility]).  At the start of the visit, I introduced myself as Rafy Branch Nurse practitioner and verified the patients name, , and current physical location.    If they were currently outside of the state [facility], the visit was ended and the patient was referred to alternative means for evaluation and treatment.   The patient was made aware of the limitations of the telehealth visit.  They will not be physically examined and all issues may not be appropriate for a telehealth visit.  If necessary, an in person referral will be made.       DISCLAIMER:   In preparing for this visit and writing this note, I reviewed previous electronic medical records (labs, imaging and medical charts) available.  Significant findings which helped in decision making are recorded in this encounter charting.     Pulmonary:      Effort: Pulmonary effort is normal.   Neurological:      Mental Status: She is alert and oriented to person, place, and time.         Assessment/Plan   Diagnoses and all orders for this visit:  Viral gastroenteritis  -     ondansetron (Zofran) 4 mg tablet; Take 1 tablet (4 mg) by mouth every 12 hours if needed for nausea or vomiting for up to 6 days.  Replenish fluids, recommend oral electrolyte replacment drink such as gatorade or  pedialyte  clear liquids until sympotms subside then advance to BRAT (banana, rice, applesauce, toast) diet.   Follow up with PCP as needed  Education provided in writing in MyChart

## 2025-02-07 ENCOUNTER — APPOINTMENT (OUTPATIENT)
Dept: NEUROLOGY | Facility: HOSPITAL | Age: 25
End: 2025-02-07
Payer: COMMERCIAL

## 2025-02-13 ENCOUNTER — APPOINTMENT (OUTPATIENT)
Dept: PRIMARY CARE | Facility: CLINIC | Age: 25
End: 2025-02-13
Payer: COMMERCIAL

## 2025-02-14 ENCOUNTER — APPOINTMENT (OUTPATIENT)
Dept: NEUROLOGY | Facility: HOSPITAL | Age: 25
End: 2025-02-14
Payer: COMMERCIAL

## 2025-02-19 ENCOUNTER — OFFICE VISIT (OUTPATIENT)
Dept: PRIMARY CARE | Facility: CLINIC | Age: 25
End: 2025-02-19
Payer: COMMERCIAL

## 2025-02-19 VITALS
WEIGHT: 238 LBS | SYSTOLIC BLOOD PRESSURE: 122 MMHG | HEART RATE: 103 BPM | DIASTOLIC BLOOD PRESSURE: 85 MMHG | BODY MASS INDEX: 42.17 KG/M2 | HEIGHT: 63 IN

## 2025-02-19 DIAGNOSIS — L70.0 ACNE VULGARIS: ICD-10-CM

## 2025-02-19 DIAGNOSIS — G90.A POTS (POSTURAL ORTHOSTATIC TACHYCARDIA SYNDROME): Primary | ICD-10-CM

## 2025-02-19 DIAGNOSIS — F90.9 ATTENTION DEFICIT HYPERACTIVITY DISORDER (ADHD), UNSPECIFIED ADHD TYPE: ICD-10-CM

## 2025-02-19 DIAGNOSIS — M79.7 FIBROMYALGIA: ICD-10-CM

## 2025-02-19 PROCEDURE — 1036F TOBACCO NON-USER: CPT | Performed by: INTERNAL MEDICINE

## 2025-02-19 PROCEDURE — 3074F SYST BP LT 130 MM HG: CPT | Performed by: INTERNAL MEDICINE

## 2025-02-19 PROCEDURE — 99214 OFFICE O/P EST MOD 30 MIN: CPT | Performed by: INTERNAL MEDICINE

## 2025-02-19 PROCEDURE — 3079F DIAST BP 80-89 MM HG: CPT | Performed by: INTERNAL MEDICINE

## 2025-02-19 PROCEDURE — 3008F BODY MASS INDEX DOCD: CPT | Performed by: INTERNAL MEDICINE

## 2025-02-19 RX ORDER — SODIUM SULFACETAMIDE 100 MG/ML
LIQUID TOPICAL
Qty: 473 ML | Refills: 1 | Status: SHIPPED | OUTPATIENT
Start: 2025-02-19

## 2025-02-19 RX ORDER — PREGABALIN 200 MG/1
200 CAPSULE ORAL 2 TIMES DAILY
Qty: 60 CAPSULE | Refills: 0 | Status: SHIPPED | OUTPATIENT
Start: 2025-02-19

## 2025-02-19 RX ORDER — METOPROLOL SUCCINATE 25 MG/1
25 TABLET, EXTENDED RELEASE ORAL DAILY
Qty: 30 TABLET | Refills: 11 | Status: SHIPPED | OUTPATIENT
Start: 2025-02-19 | End: 2026-02-19

## 2025-02-19 RX ORDER — METHYLPHENIDATE HYDROCHLORIDE 27 MG/1
27 TABLET, EXTENDED RELEASE ORAL EVERY MORNING
Qty: 30 TABLET | Refills: 0 | Status: SHIPPED | OUTPATIENT
Start: 2025-02-19 | End: 2025-03-21

## 2025-02-19 ASSESSMENT — ENCOUNTER SYMPTOMS
PALPITATIONS: 0
MUSCULOSKELETAL NEGATIVE: 1
AGITATION: 0
NEUROLOGICAL NEGATIVE: 1
LIGHT-HEADEDNESS: 0
BACK PAIN: 0
GASTROINTESTINAL NEGATIVE: 1
DIARRHEA: 0
FEVER: 0
JOINT SWELLING: 0
RESPIRATORY NEGATIVE: 1
CHILLS: 0
ABDOMINAL DISTENTION: 0
WHEEZING: 0
EYES NEGATIVE: 1
ABDOMINAL PAIN: 0
PSYCHIATRIC NEGATIVE: 1
ALLERGIC/IMMUNOLOGIC NEGATIVE: 1
NAUSEA: 0
NECK STIFFNESS: 0
CONSTITUTIONAL NEGATIVE: 1
HEADACHES: 0
VOMITING: 0
NUMBNESS: 0
EYE DISCHARGE: 0
SHORTNESS OF BREATH: 0
CONSTIPATION: 0
CARDIOVASCULAR NEGATIVE: 1
ENDOCRINE NEGATIVE: 1
COUGH: 0
DYSURIA: 0
ADENOPATHY: 0
HEMATOLOGIC/LYMPHATIC NEGATIVE: 1
CONFUSION: 0

## 2025-02-19 NOTE — PROGRESS NOTES
"Subjective   Patient ID: Mable Carrillo \"Jaqueline\" is a 24 y.o. female who presents for Med Refill (Lyrica and concerta want s to discuss both).  Med refill  Feels fine  Concerta and lyrica effective  No side effects    Gets tachycardia when changing position    Med Refill  Pertinent negatives include no abdominal pain, chest pain, chills, congestion, coughing, fever, headaches, joint swelling, nausea, numbness, rash or vomiting.       Review of Systems   Constitutional: Negative.  Negative for chills and fever.   HENT: Negative.  Negative for congestion.    Eyes: Negative.  Negative for discharge.   Respiratory: Negative.  Negative for cough, shortness of breath and wheezing.    Cardiovascular: Negative.  Negative for chest pain, palpitations and leg swelling.   Gastrointestinal: Negative.  Negative for abdominal distention, abdominal pain, constipation, diarrhea, nausea and vomiting.   Endocrine: Negative.    Genitourinary: Negative.  Negative for dysuria and urgency.   Musculoskeletal: Negative.  Negative for back pain, joint swelling and neck stiffness.   Skin: Negative.  Negative for rash.   Allergic/Immunologic: Negative.  Negative for immunocompromised state.   Neurological: Negative.  Negative for light-headedness, numbness and headaches.   Hematological: Negative.  Negative for adenopathy.   Psychiatric/Behavioral: Negative.  Negative for agitation, behavioral problems and confusion.    All other systems reviewed and are negative.      Objective   Physical Exam  Vitals reviewed.   Constitutional:       General: She is not in acute distress.     Appearance: Normal appearance.   HENT:      Head: Normocephalic and atraumatic.      Nose: Nose normal.   Eyes:      Conjunctiva/sclera: Conjunctivae normal.      Pupils: Pupils are equal, round, and reactive to light.   Neck:      Vascular: No carotid bruit.   Cardiovascular:      Rate and Rhythm: Normal rate and regular rhythm.      Pulses: Normal pulses. " "     Heart sounds:      No gallop.   Pulmonary:      Effort: Pulmonary effort is normal. No respiratory distress.      Breath sounds: Normal breath sounds. No wheezing.   Abdominal:      General: Bowel sounds are normal.      Palpations: Abdomen is soft.      Tenderness: There is no abdominal tenderness.   Musculoskeletal:         General: Normal range of motion.      Cervical back: Normal range of motion. No rigidity.   Lymphadenopathy:      Cervical: No cervical adenopathy.   Skin:     General: Skin is warm.      Findings: No rash.   Neurological:      General: No focal deficit present.      Mental Status: She is alert and oriented to person, place, and time.   Psychiatric:         Mood and Affect: Mood normal.         Behavior: Behavior normal.       /85 (BP Location: Right arm, Patient Position: Sitting)   Pulse 103   Ht 1.6 m (5' 3\")   Wt 108 kg (238 lb)   BMI 42.16 kg/m²    Hemoglobin A1C   Date/Time Value Ref Range Status   10/12/2024 11:46 AM 5.9 (H) See comment % Final     Assessment/Plan   Problem List Items Addressed This Visit       Fibromyalgia    Relevant Medications    pregabalin (Lyrica) 200 mg capsule    ADHD    Relevant Medications    Concerta 27 mg OSM extended release tablet     Other Visit Diagnoses       POTS (postural orthostatic tachycardia syndrome)    -  Primary    Relevant Medications    metoprolol succinate XL (Toprol-XL) 25 mg 24 hr tablet    Acne vulgaris        Relevant Medications    sulfacetamide sodium 10 % cleanser             OARRS HAS BEEN REVIEWED AND IS CONSISTENT WITH PRESCRIBED MEDICATIONS, CONSIDERED THE RISK OF ABUSE, DEPENDENCE, ADDICTION AND DIVERSION, MEDICATION IS FELT TO BE CLINICALLY APPROPRIATE ON THE DOCUMENTED DIAGNOSIS    Utox and contract up  to date    Fu 1 mo  "

## 2025-02-24 ENCOUNTER — APPOINTMENT (OUTPATIENT)
Dept: PRIMARY CARE | Facility: CLINIC | Age: 25
End: 2025-02-24
Payer: COMMERCIAL

## 2025-03-17 ENCOUNTER — APPOINTMENT (OUTPATIENT)
Dept: PRIMARY CARE | Facility: CLINIC | Age: 25
End: 2025-03-17
Payer: COMMERCIAL

## 2025-04-04 DIAGNOSIS — F41.9 ANXIETY: ICD-10-CM

## 2025-04-08 ENCOUNTER — APPOINTMENT (OUTPATIENT)
Dept: PRIMARY CARE | Facility: CLINIC | Age: 25
End: 2025-04-08
Payer: COMMERCIAL

## 2025-04-08 VITALS
DIASTOLIC BLOOD PRESSURE: 82 MMHG | HEIGHT: 63 IN | BODY MASS INDEX: 41.64 KG/M2 | HEART RATE: 89 BPM | SYSTOLIC BLOOD PRESSURE: 116 MMHG | WEIGHT: 235 LBS

## 2025-04-08 DIAGNOSIS — F90.9 ATTENTION DEFICIT HYPERACTIVITY DISORDER (ADHD), UNSPECIFIED ADHD TYPE: ICD-10-CM

## 2025-04-08 PROCEDURE — 99213 OFFICE O/P EST LOW 20 MIN: CPT | Performed by: INTERNAL MEDICINE

## 2025-04-08 PROCEDURE — 3074F SYST BP LT 130 MM HG: CPT | Performed by: INTERNAL MEDICINE

## 2025-04-08 PROCEDURE — 1036F TOBACCO NON-USER: CPT | Performed by: INTERNAL MEDICINE

## 2025-04-08 PROCEDURE — 3079F DIAST BP 80-89 MM HG: CPT | Performed by: INTERNAL MEDICINE

## 2025-04-08 PROCEDURE — 3008F BODY MASS INDEX DOCD: CPT | Performed by: INTERNAL MEDICINE

## 2025-04-08 RX ORDER — DULOXETIN HYDROCHLORIDE 60 MG/1
60 CAPSULE, DELAYED RELEASE ORAL DAILY
Qty: 30 CAPSULE | Refills: 0 | Status: SHIPPED | OUTPATIENT
Start: 2025-04-08

## 2025-04-08 RX ORDER — METHYLPHENIDATE HYDROCHLORIDE 27 MG/1
27 TABLET, EXTENDED RELEASE ORAL EVERY MORNING
Qty: 30 TABLET | Refills: 0 | Status: SHIPPED | OUTPATIENT
Start: 2025-04-08 | End: 2025-05-08

## 2025-04-08 ASSESSMENT — ENCOUNTER SYMPTOMS
ABDOMINAL DISTENTION: 0
HEMATOLOGIC/LYMPHATIC NEGATIVE: 1
CONSTITUTIONAL NEGATIVE: 1
CONFUSION: 0
FEVER: 0
RESPIRATORY NEGATIVE: 1
BACK PAIN: 0
CARDIOVASCULAR NEGATIVE: 1
AGITATION: 0
NECK STIFFNESS: 0
NUMBNESS: 0
LIGHT-HEADEDNESS: 0
COUGH: 0
ABDOMINAL PAIN: 0
NAUSEA: 0
DIARRHEA: 0
WHEEZING: 0
ENDOCRINE NEGATIVE: 1
HEADACHES: 0
VOMITING: 0
DYSURIA: 0
GASTROINTESTINAL NEGATIVE: 1
CHILLS: 0
ADENOPATHY: 0
NEUROLOGICAL NEGATIVE: 1
EYE DISCHARGE: 0
JOINT SWELLING: 0
EYES NEGATIVE: 1
PSYCHIATRIC NEGATIVE: 1
MUSCULOSKELETAL NEGATIVE: 1
ALLERGIC/IMMUNOLOGIC NEGATIVE: 1
SHORTNESS OF BREATH: 0
PALPITATIONS: 0
CONSTIPATION: 0

## 2025-04-08 NOTE — PROGRESS NOTES
"Subjective   Patient ID: Mable Carrillo \"Jaqueline\" is a 24 y.o. female who presents for Med Refill (concerta).  Med refill  Feels fine   Concerta effective   No side effects    Med Refill  Pertinent negatives include no abdominal pain, chest pain, chills, congestion, coughing, fever, headaches, joint swelling, nausea, numbness, rash or vomiting.       Review of Systems   Constitutional: Negative.  Negative for chills and fever.   HENT: Negative.  Negative for congestion.    Eyes: Negative.  Negative for discharge.   Respiratory: Negative.  Negative for cough, shortness of breath and wheezing.    Cardiovascular: Negative.  Negative for chest pain, palpitations and leg swelling.   Gastrointestinal: Negative.  Negative for abdominal distention, abdominal pain, constipation, diarrhea, nausea and vomiting.   Endocrine: Negative.    Genitourinary: Negative.  Negative for dysuria and urgency.   Musculoskeletal: Negative.  Negative for back pain, joint swelling and neck stiffness.   Skin: Negative.  Negative for rash.   Allergic/Immunologic: Negative.  Negative for immunocompromised state.   Neurological: Negative.  Negative for light-headedness, numbness and headaches.   Hematological: Negative.  Negative for adenopathy.   Psychiatric/Behavioral: Negative.  Negative for agitation, behavioral problems and confusion.    All other systems reviewed and are negative.      Objective   Physical Exam  Vitals reviewed.   Constitutional:       General: She is not in acute distress.     Appearance: Normal appearance.   HENT:      Head: Normocephalic and atraumatic.      Nose: Nose normal.   Eyes:      Conjunctiva/sclera: Conjunctivae normal.      Pupils: Pupils are equal, round, and reactive to light.   Neck:      Vascular: No carotid bruit.   Cardiovascular:      Rate and Rhythm: Normal rate and regular rhythm.      Pulses: Normal pulses.      Heart sounds:      No gallop.   Pulmonary:      Effort: Pulmonary effort is " "normal. No respiratory distress.      Breath sounds: Normal breath sounds. No wheezing.   Abdominal:      General: Bowel sounds are normal.      Palpations: Abdomen is soft.      Tenderness: There is no abdominal tenderness.   Musculoskeletal:         General: Normal range of motion.      Cervical back: Normal range of motion. No rigidity.   Lymphadenopathy:      Cervical: No cervical adenopathy.   Skin:     General: Skin is warm.      Findings: No rash.   Neurological:      General: No focal deficit present.      Mental Status: She is alert and oriented to person, place, and time.   Psychiatric:         Mood and Affect: Mood normal.         Behavior: Behavior normal.       /82 (BP Location: Right arm, Patient Position: Sitting)   Pulse 89   Ht 1.6 m (5' 3\")   Wt 107 kg (235 lb)   BMI 41.63 kg/m²    Hemoglobin A1C   Date/Time Value Ref Range Status   10/12/2024 11:46 AM 5.9 (H) See comment % Final     Assessment/Plan   Problem List Items Addressed This Visit       ADHD    Relevant Medications    Concerta 27 mg OSM extended release tablet        OARRS HAS BEEN REVIEWED AND IS CONSISTENT WITH PRESCRIBED MEDICATIONS, CONSIDERED THE RISK OF ABUSE, DEPENDENCE, ADDICTION AND DIVERSION, MEDICATION IS FELT TO BE CLINICALLY APPROPRIATE ON THE DOCUMENTED DIAGNOSIS    Utox contract up to date    Fu 1 mo  "

## 2025-05-06 ENCOUNTER — APPOINTMENT (OUTPATIENT)
Dept: PRIMARY CARE | Facility: CLINIC | Age: 25
End: 2025-05-06
Payer: COMMERCIAL

## 2025-05-06 VITALS
HEART RATE: 109 BPM | BODY MASS INDEX: 41.64 KG/M2 | DIASTOLIC BLOOD PRESSURE: 87 MMHG | WEIGHT: 235 LBS | SYSTOLIC BLOOD PRESSURE: 145 MMHG | HEIGHT: 63 IN

## 2025-05-06 DIAGNOSIS — E55.9 VITAMIN D DEFICIENCY: ICD-10-CM

## 2025-05-06 DIAGNOSIS — F41.9 ANXIETY: ICD-10-CM

## 2025-05-06 DIAGNOSIS — M79.7 FIBROMYALGIA: ICD-10-CM

## 2025-05-06 DIAGNOSIS — E78.5 HYPERLIPIDEMIA, UNSPECIFIED HYPERLIPIDEMIA TYPE: ICD-10-CM

## 2025-05-06 DIAGNOSIS — F90.9 ATTENTION DEFICIT HYPERACTIVITY DISORDER (ADHD), UNSPECIFIED ADHD TYPE: ICD-10-CM

## 2025-05-06 DIAGNOSIS — E21.3 HYPERPARATHYROIDISM (MULTI): Primary | ICD-10-CM

## 2025-05-06 PROCEDURE — 99214 OFFICE O/P EST MOD 30 MIN: CPT | Performed by: INTERNAL MEDICINE

## 2025-05-06 PROCEDURE — 3077F SYST BP >= 140 MM HG: CPT | Performed by: INTERNAL MEDICINE

## 2025-05-06 PROCEDURE — 3079F DIAST BP 80-89 MM HG: CPT | Performed by: INTERNAL MEDICINE

## 2025-05-06 PROCEDURE — 3008F BODY MASS INDEX DOCD: CPT | Performed by: INTERNAL MEDICINE

## 2025-05-06 PROCEDURE — 1036F TOBACCO NON-USER: CPT | Performed by: INTERNAL MEDICINE

## 2025-05-06 RX ORDER — METHYLPHENIDATE HYDROCHLORIDE 27 MG/1
27 TABLET, EXTENDED RELEASE ORAL EVERY MORNING
Qty: 30 TABLET | Refills: 0 | Status: SHIPPED | OUTPATIENT
Start: 2025-05-06 | End: 2025-06-05

## 2025-05-06 RX ORDER — PREGABALIN 200 MG/1
200 CAPSULE ORAL 2 TIMES DAILY
Qty: 60 CAPSULE | Refills: 0 | Status: SHIPPED | OUTPATIENT
Start: 2025-05-06

## 2025-05-06 RX ORDER — DULOXETIN HYDROCHLORIDE 60 MG/1
60 CAPSULE, DELAYED RELEASE ORAL DAILY
Qty: 30 CAPSULE | Refills: 0 | Status: SHIPPED | OUTPATIENT
Start: 2025-05-06

## 2025-05-06 ASSESSMENT — ENCOUNTER SYMPTOMS
PSYCHIATRIC NEGATIVE: 1
SHORTNESS OF BREATH: 0
BACK PAIN: 0
WHEEZING: 0
EYE DISCHARGE: 0
ADENOPATHY: 0
LIGHT-HEADEDNESS: 0
NUMBNESS: 0
ALLERGIC/IMMUNOLOGIC NEGATIVE: 1
HEADACHES: 0
GASTROINTESTINAL NEGATIVE: 1
CARDIOVASCULAR NEGATIVE: 1
RESPIRATORY NEGATIVE: 1
MUSCULOSKELETAL NEGATIVE: 1
PALPITATIONS: 0
FEVER: 0
CHILLS: 0
JOINT SWELLING: 0
ABDOMINAL DISTENTION: 0
AGITATION: 0
NECK STIFFNESS: 0
EYES NEGATIVE: 1
ENDOCRINE NEGATIVE: 1
ABDOMINAL PAIN: 0
COUGH: 0
HEMATOLOGIC/LYMPHATIC NEGATIVE: 1
DIARRHEA: 0
NEUROLOGICAL NEGATIVE: 1
NAUSEA: 0
DYSURIA: 0
VOMITING: 0
CONSTITUTIONAL NEGATIVE: 1
CONFUSION: 0
CONSTIPATION: 0

## 2025-05-06 ASSESSMENT — PATIENT HEALTH QUESTIONNAIRE - PHQ9
1. LITTLE INTEREST OR PLEASURE IN DOING THINGS: NOT AT ALL
SUM OF ALL RESPONSES TO PHQ9 QUESTIONS 1 AND 2: 0
2. FEELING DOWN, DEPRESSED OR HOPELESS: NOT AT ALL

## 2025-05-06 NOTE — PROGRESS NOTES
"Subjective   Patient ID: Mable Carrillo \"Jaqueline\" is a 24 y.o. female who presents for Med Refill (Concerta and lyrica).  MED REFILL  FEELS FINE  NO SIDE EFFECT  MEDS ARE EFFECTIVE    Med Refill  Pertinent negatives include no abdominal pain, chest pain, chills, congestion, coughing, fever, headaches, joint swelling, nausea, numbness, rash or vomiting.       Review of Systems   Constitutional: Negative.  Negative for chills and fever.   HENT: Negative.  Negative for congestion.    Eyes: Negative.  Negative for discharge.   Respiratory: Negative.  Negative for cough, shortness of breath and wheezing.    Cardiovascular: Negative.  Negative for chest pain, palpitations and leg swelling.   Gastrointestinal: Negative.  Negative for abdominal distention, abdominal pain, constipation, diarrhea, nausea and vomiting.   Endocrine: Negative.    Genitourinary: Negative.  Negative for dysuria and urgency.   Musculoskeletal: Negative.  Negative for back pain, joint swelling and neck stiffness.   Skin: Negative.  Negative for rash.   Allergic/Immunologic: Negative.  Negative for immunocompromised state.   Neurological: Negative.  Negative for light-headedness, numbness and headaches.   Hematological: Negative.  Negative for adenopathy.   Psychiatric/Behavioral: Negative.  Negative for agitation, behavioral problems and confusion.    All other systems reviewed and are negative.      Objective   Physical Exam  Vitals reviewed.   Constitutional:       General: She is not in acute distress.     Appearance: Normal appearance.   HENT:      Head: Normocephalic and atraumatic.      Nose: Nose normal.   Eyes:      Conjunctiva/sclera: Conjunctivae normal.      Pupils: Pupils are equal, round, and reactive to light.   Neck:      Vascular: No carotid bruit.   Cardiovascular:      Rate and Rhythm: Normal rate and regular rhythm.      Pulses: Normal pulses.      Heart sounds:      No gallop.   Pulmonary:      Effort: Pulmonary effort " "is normal. No respiratory distress.      Breath sounds: Normal breath sounds. No wheezing.   Abdominal:      General: Bowel sounds are normal.      Palpations: Abdomen is soft.      Tenderness: There is no abdominal tenderness.   Musculoskeletal:         General: Normal range of motion.      Cervical back: Normal range of motion. No rigidity.   Lymphadenopathy:      Cervical: No cervical adenopathy.   Skin:     General: Skin is warm.      Findings: No rash.   Neurological:      General: No focal deficit present.      Mental Status: She is alert and oriented to person, place, and time.   Psychiatric:         Mood and Affect: Mood normal.         Behavior: Behavior normal.       /87 (BP Location: Left arm, Patient Position: Sitting)   Pulse 109   Ht 1.6 m (5' 3\")   Wt 107 kg (235 lb)   BMI 41.63 kg/m²    Hemoglobin A1C   Date/Time Value Ref Range Status   10/12/2024 11:46 AM 5.9 (H) See comment % Final     Assessment/Plan   Problem List Items Addressed This Visit       Fibromyalgia    Relevant Medications    pregabalin (Lyrica) 200 mg capsule    Other Relevant Orders    CBC and Auto Differential    Comprehensive Metabolic Panel    TSH with reflex to Free T4 if abnormal    Vitamin D deficiency    Relevant Orders    Vitamin D 25-Hydroxy,Total (for eval of Vitamin D levels)    CBC and Auto Differential    Comprehensive Metabolic Panel    TSH with reflex to Free T4 if abnormal    ADHD    Relevant Medications    Concerta 27 mg OSM extended release tablet    Other Relevant Orders    CBC and Auto Differential    Comprehensive Metabolic Panel    TSH with reflex to Free T4 if abnormal    Hyperlipidemia    Relevant Orders    Lipid Panel    TSH with reflex to Free T4 if abnormal    Hyperparathyroidism (Multi) - Primary    Relevant Orders    PTH, intact    CBC and Auto Differential    Comprehensive Metabolic Panel    TSH with reflex to Free T4 if abnormal        OARRS HAS BEEN REVIEWED AND IS CONSISTENT WITH PRESCRIBED " MEDICATIONS, CONSIDERED THE RISK OF ABUSE, DEPENDENCE, ADDICTION AND DIVERSION, MEDICATION IS FELT TO BE CLINICALLY APPROPRIATE ON THE DOCUMENTED DIAGNOSIS    Fu 1 mo

## 2025-05-18 ENCOUNTER — E-VISIT (OUTPATIENT)
Dept: PRIMARY CARE | Facility: CLINIC | Age: 25
End: 2025-05-18
Payer: COMMERCIAL

## 2025-05-18 DIAGNOSIS — J01.10 ACUTE NON-RECURRENT FRONTAL SINUSITIS: Primary | ICD-10-CM

## 2025-05-18 PROCEDURE — 99421 OL DIG E/M SVC 5-10 MIN: CPT | Performed by: NURSE PRACTITIONER

## 2025-05-18 RX ORDER — DOXYCYCLINE 100 MG/1
100 CAPSULE ORAL 2 TIMES DAILY
Qty: 10 CAPSULE | Refills: 0 | Status: SHIPPED | OUTPATIENT
Start: 2025-05-18 | End: 2025-05-23

## 2025-05-18 ASSESSMENT — LIFESTYLE VARIABLES: HISTORY_OF_SMOKING: I HAVE NEVER SMOKED

## 2025-05-18 NOTE — TELEPHONE ENCOUNTER
Concern for sinusitis    No severe neck pain  No photophobia  No N,V,D  No fever  No vision changes  No SOB    Pertinent positives:  Congested  Pain in nose and face  Ear pain  Cough  Sneezing  Purulent colored nasal drainage  Facial pressure  Decreased or loss of taste and smell  Body aches    Other ROS (-)    Sinusitis  Antibiotic as written    Total time spent 5 minutes

## 2025-06-10 ENCOUNTER — APPOINTMENT (OUTPATIENT)
Dept: PRIMARY CARE | Facility: CLINIC | Age: 25
End: 2025-06-10
Payer: COMMERCIAL

## 2025-06-10 VITALS
DIASTOLIC BLOOD PRESSURE: 90 MMHG | WEIGHT: 236.4 LBS | HEART RATE: 106 BPM | HEIGHT: 63 IN | SYSTOLIC BLOOD PRESSURE: 122 MMHG | BODY MASS INDEX: 41.89 KG/M2

## 2025-06-10 DIAGNOSIS — M79.7 FIBROMYALGIA: Primary | ICD-10-CM

## 2025-06-10 DIAGNOSIS — E66.813 CLASS 3 SEVERE OBESITY DUE TO EXCESS CALORIES WITHOUT SERIOUS COMORBIDITY WITH BODY MASS INDEX (BMI) OF 40.0 TO 44.9 IN ADULT: ICD-10-CM

## 2025-06-10 DIAGNOSIS — F90.9 ATTENTION DEFICIT HYPERACTIVITY DISORDER (ADHD), UNSPECIFIED ADHD TYPE: ICD-10-CM

## 2025-06-10 DIAGNOSIS — E55.9 VITAMIN D DEFICIENCY: ICD-10-CM

## 2025-06-10 DIAGNOSIS — F41.9 ANXIETY: ICD-10-CM

## 2025-06-10 DIAGNOSIS — R71.8 ELEVATED RED BLOOD CELL COUNT: ICD-10-CM

## 2025-06-10 LAB
25(OH)D3+25(OH)D2 SERPL-MCNC: 27 NG/ML (ref 30–100)
ALBUMIN SERPL-MCNC: 4.3 G/DL (ref 3.6–5.1)
ALP SERPL-CCNC: 67 U/L (ref 31–125)
ALT SERPL-CCNC: 27 U/L (ref 6–29)
ANION GAP SERPL CALCULATED.4IONS-SCNC: 9 MMOL/L (CALC) (ref 7–17)
AST SERPL-CCNC: 21 U/L (ref 10–30)
BASOPHILS # BLD AUTO: 68 CELLS/UL (ref 0–200)
BASOPHILS NFR BLD AUTO: 1 %
BILIRUB SERPL-MCNC: 0.4 MG/DL (ref 0.2–1.2)
BUN SERPL-MCNC: 8 MG/DL (ref 7–25)
CALCIUM SERPL-MCNC: 9.2 MG/DL (ref 8.6–10.2)
CHLORIDE SERPL-SCNC: 104 MMOL/L (ref 98–110)
CHOLEST SERPL-MCNC: 150 MG/DL
CHOLEST/HDLC SERPL: 2.6 (CALC)
CO2 SERPL-SCNC: 25 MMOL/L (ref 20–32)
CREAT SERPL-MCNC: 0.65 MG/DL (ref 0.5–0.96)
EGFRCR SERPLBLD CKD-EPI 2021: 124 ML/MIN/1.73M2
EOSINOPHIL # BLD AUTO: 197 CELLS/UL (ref 15–500)
EOSINOPHIL NFR BLD AUTO: 2.9 %
ERYTHROCYTE [DISTWIDTH] IN BLOOD BY AUTOMATED COUNT: 13.5 % (ref 11–15)
GLUCOSE SERPL-MCNC: 92 MG/DL (ref 65–99)
HCT VFR BLD AUTO: 44.4 % (ref 35–45)
HDLC SERPL-MCNC: 58 MG/DL
HGB BLD-MCNC: 14.5 G/DL (ref 11.7–15.5)
LDLC SERPL CALC-MCNC: 66 MG/DL (CALC)
LYMPHOCYTES # BLD AUTO: 2625 CELLS/UL (ref 850–3900)
LYMPHOCYTES NFR BLD AUTO: 38.6 %
MCH RBC QN AUTO: 28.2 PG (ref 27–33)
MCHC RBC AUTO-ENTMCNC: 32.7 G/DL (ref 32–36)
MCV RBC AUTO: 86.2 FL (ref 80–100)
MONOCYTES # BLD AUTO: 381 CELLS/UL (ref 200–950)
MONOCYTES NFR BLD AUTO: 5.6 %
NEUTROPHILS # BLD AUTO: 3529 CELLS/UL (ref 1500–7800)
NEUTROPHILS NFR BLD AUTO: 51.9 %
NONHDLC SERPL-MCNC: 92 MG/DL (CALC)
PLATELET # BLD AUTO: 440 THOUSAND/UL (ref 140–400)
PMV BLD REES-ECKER: 9.3 FL (ref 7.5–12.5)
POTASSIUM SERPL-SCNC: 4.3 MMOL/L (ref 3.5–5.3)
PROT SERPL-MCNC: 7.6 G/DL (ref 6.1–8.1)
PTH-INTACT SERPL-MCNC: 92 PG/ML (ref 16–77)
RBC # BLD AUTO: 5.15 MILLION/UL (ref 3.8–5.1)
SODIUM SERPL-SCNC: 138 MMOL/L (ref 135–146)
TRIGL SERPL-MCNC: 183 MG/DL
TSH SERPL-ACNC: 1.56 MIU/L
WBC # BLD AUTO: 6.8 THOUSAND/UL (ref 3.8–10.8)

## 2025-06-10 PROCEDURE — 3080F DIAST BP >= 90 MM HG: CPT | Performed by: NURSE PRACTITIONER

## 2025-06-10 PROCEDURE — 3008F BODY MASS INDEX DOCD: CPT | Performed by: NURSE PRACTITIONER

## 2025-06-10 PROCEDURE — 1036F TOBACCO NON-USER: CPT | Performed by: NURSE PRACTITIONER

## 2025-06-10 PROCEDURE — 3074F SYST BP LT 130 MM HG: CPT | Performed by: NURSE PRACTITIONER

## 2025-06-10 PROCEDURE — 99214 OFFICE O/P EST MOD 30 MIN: CPT | Performed by: NURSE PRACTITIONER

## 2025-06-10 RX ORDER — PREGABALIN 200 MG/1
200 CAPSULE ORAL 2 TIMES DAILY
Qty: 60 CAPSULE | Refills: 2 | Status: SHIPPED | OUTPATIENT
Start: 2025-06-10

## 2025-06-10 RX ORDER — METHYLPHENIDATE HYDROCHLORIDE 27 MG/1
27 TABLET, EXTENDED RELEASE ORAL EVERY MORNING
Qty: 30 TABLET | Refills: 0 | Status: SHIPPED | OUTPATIENT
Start: 2025-06-10 | End: 2025-07-10

## 2025-06-10 RX ORDER — DULOXETIN HYDROCHLORIDE 60 MG/1
60 CAPSULE, DELAYED RELEASE ORAL DAILY
Qty: 90 CAPSULE | Refills: 1 | Status: SHIPPED | OUTPATIENT
Start: 2025-06-10

## 2025-06-10 RX ORDER — PSYLLIUM HUSK 0.4 G
1 CAPSULE ORAL 2 TIMES DAILY
Qty: 180 TABLET | Refills: 3 | Status: SHIPPED | OUTPATIENT
Start: 2025-06-10

## 2025-06-10 ASSESSMENT — ENCOUNTER SYMPTOMS
NAUSEA: 0
WHEEZING: 0
EYE PAIN: 0
TROUBLE SWALLOWING: 0
ARTHRALGIAS: 0
FACIAL ASYMMETRY: 0
NUMBNESS: 0
ABDOMINAL DISTENTION: 0
DIARRHEA: 0
EYE REDNESS: 0
WEAKNESS: 0
DYSURIA: 0
COUGH: 0
JOINT SWELLING: 0
ABDOMINAL PAIN: 0
WOUND: 0
PALPITATIONS: 0
FATIGUE: 0
HEMATURIA: 0
BACK PAIN: 0
SHORTNESS OF BREATH: 0
SPEECH DIFFICULTY: 0
APNEA: 0
FREQUENCY: 0
CONFUSION: 0
CHEST TIGHTNESS: 0
HEADACHES: 0
VOMITING: 0
UNEXPECTED WEIGHT CHANGE: 0
CHOKING: 0
CHILLS: 0
SLEEP DISTURBANCE: 0
FLANK PAIN: 0
MYALGIAS: 0
DIFFICULTY URINATING: 0
DIZZINESS: 0
CONSTIPATION: 0
SEIZURES: 0
NECK PAIN: 0
BLOOD IN STOOL: 0
SORE THROAT: 0
PHOTOPHOBIA: 0
NERVOUS/ANXIOUS: 0
FEVER: 0

## 2025-06-10 ASSESSMENT — PATIENT HEALTH QUESTIONNAIRE - PHQ9
SUM OF ALL RESPONSES TO PHQ9 QUESTIONS 1 AND 2: 2
2. FEELING DOWN, DEPRESSED OR HOPELESS: SEVERAL DAYS
1. LITTLE INTEREST OR PLEASURE IN DOING THINGS: SEVERAL DAYS

## 2025-06-10 NOTE — PROGRESS NOTES
"Subjective   Patient ID: Mable Carrillo \"Jaquleine\" is a 24 y.o. female who presents for Follow-up (1 MONTH FOLLOW UP).      HPI:  Presents today for WITH LABS.  MED REFILL OF LYRICA WHICH SHE TAKES FOR FIBRO. SHE IS DOING WELL ON MED AND DENIES SIDE EFFECTS.      MED REFILL OF CONCERTA WHICH SHE TAKES FOR ADD. SHE IS DOING WELL ON MED AND DENIES SIDE EFFECTS.        NO NEW COMPLAINTS       PTH- SEEN ENDO AND FOLLOWING WITH THEM  VIT D- INCREASE CALCIUM WITH VIT D T BID FROM DAILY  LIPIDS-  DIET MODIFICATIONS DISCUSSED    O[facility]:  Zainab Gomez, APRN-CNP on 6/10/2025  9:48 AM  I have personally reviewed the OAR[facility] report for Mable Carrillo. I have considered the risks of abuse, dependence, addiction and diversion and I believe that it is clinically appropriate for Mable Carrillo to be prescribed this medication    Is the patient prescribed a combination of a benzodiazepine and opioid?  No    Last Urine Drug Screen / ordered today: No  Recent Results (from the past 8760 hours)   Confirmation Opiate/Opioid/Benzo Prescription Compliance    Collection Time: 09/24/24  2:06 PM   Result Value Ref Range    Clonazepam <25 <25 ng/mL    7-Aminoclonazepam <25 <25 ng/mL    Alprazolam <25 <25 ng/mL    Alpha-Hydroxyalprazolam <25 <25 ng/mL    Midazolam <25 <25 ng/mL    Alpha-Hydroxymidazolam <25 <25 ng/mL    Chlordiazepoxide <25 <25 ng/mL    Diazepam <25 <25 ng/mL    Nordiazepam <25 <25 ng/mL    Temazepam <25 <25 ng/mL    Oxazepam <25 <25 ng/mL    Lorazepam <25 <25 ng/mL    Methadone <25 <25 ng/mL    EDDP <25 <25 ng/mL    6-Acetylmorphine <25 <25 ng/mL    Codeine <50 <50 ng/mL    Hydrocodone <25 <25 ng/mL    Hydromorphone <25 <25 ng/mL    Morphine  <50 <50 ng/mL    Norhydrocodone <25 <25 ng/mL    Noroxycodone <25 <25 ng/mL    Oxycodone <25 <25 ng/mL    Oxymorphone <25 <25 ng/mL    Fentanyl <2.5 <2.5 ng/mL    Norfentanyl <2.5 <2.5 ng/mL    Tramadol <50 <50 ng/mL    O-Desmethyltramadol <50 <50 ng/mL    " "Zolpidem <25 <25 ng/mL    Zolpidem Metabolite (ZCA) <25 <25 ng/mL   Screen Opiate/Opioid/Benzo Prescription Compliance    Collection Time: 09/24/24  2:05 PM   Result Value Ref Range    Creatinine, Urine Random 79.6 20.0 - 320.0 mg/dL    Amphetamine Screen, Urine Presumptive Negative Presumptive Negative    Barbiturate Screen, Urine Presumptive Negative Presumptive Negative    Cannabinoid Screen, Urine Presumptive Negative Presumptive Negative    Cocaine Metabolite Screen, Urine Presumptive Negative Presumptive Negative    PCP Screen, Urine Presumptive Negative Presumptive Negative     Results are as expected.         Controlled Substance Agreement:  Date of the Last Agreement: 09/24/2024  Reviewed Controlled Substance Agreement including but not limited to the benefits, risks, and alternatives to treatment with a Controlled Substance medication(s).      Visit Vitals  /90   Pulse 106   Ht 1.6 m (5' 3\")   Wt 107 kg (236 lb 6.4 oz)   BMI 41.88 kg/m²   OB Status Having periods   Smoking Status Never   BSA 2.18 m²        Review of Systems   Constitutional:  Negative for chills, fatigue, fever and unexpected weight change.   HENT:  Negative for congestion, ear pain, sore throat and trouble swallowing.    Eyes:  Negative for photophobia, pain, redness and visual disturbance.   Respiratory:  Negative for apnea, cough, choking, chest tightness, shortness of breath and wheezing.    Cardiovascular:  Negative for chest pain, palpitations and leg swelling.   Gastrointestinal:  Negative for abdominal distention, abdominal pain, blood in stool, constipation, diarrhea, nausea and vomiting.   Genitourinary:  Negative for difficulty urinating, dysuria, flank pain, frequency, hematuria and urgency.   Musculoskeletal:  Negative for arthralgias, back pain, gait problem, joint swelling, myalgias and neck pain.   Skin:  Negative for rash and wound.   Neurological:  Negative for dizziness, seizures, syncope, facial asymmetry, speech " difficulty, weakness, numbness and headaches.   Psychiatric/Behavioral:  Negative for confusion, sleep disturbance and suicidal ideas. The patient is not nervous/anxious.        Objective   Component      Latest Ref Rng 6/9/2025   WHITE BLOOD CELL COUNT      3.8 - 10.8 Thousand/uL 6.8    RED BLOOD CELL COUNT      3.80 - 5.10 Million/uL 5.15 (H)    HEMOGLOBIN      11.7 - 15.5 g/dL 14.5    HEMATOCRIT      35.0 - 45.0 % 44.4    MCV      80.0 - 100.0 fL 86.2    MCH      27.0 - 33.0 pg 28.2    MCHC      32.0 - 36.0 g/dL 32.7    RDW      11.0 - 15.0 % 13.5    PLATELET COUNT      140 - 400 Thousand/uL 440 (H)    MPV      7.5 - 12.5 fL 9.3    ABSOLUTE NEUTROPHILS      1,500 - 7,800 cells/uL 3,529    ABSOLUTE LYMPHOCYTES      850 - 3,900 cells/uL 2,625    ABSOLUTE MONOCYTES      200 - 950 cells/uL 381    ABSOLUTE EOSINOPHILS      15 - 500 cells/uL 197    ABSOLUTE BASOPHILS      0 - 200 cells/uL 68    NEUTROPHILS      % 51.9    LYMPHOCYTES      % 38.6    MONOCYTES      % 5.6    EOSINOPHILS      % 2.9    BASOPHILS      % 1.0    GLUCOSE      65 - 99 mg/dL 92    UREA NITROGEN (BUN)      7 - 25 mg/dL 8    CREATININE      0.50 - 0.96 mg/dL 0.65    EGFR      > OR = 60 mL/min/1.73m2 124    SODIUM      135 - 146 mmol/L 138    POTASSIUM      3.5 - 5.3 mmol/L 4.3    CHLORIDE      98 - 110 mmol/L 104    CARBON DIOXIDE      20 - 32 mmol/L 25    ELECTROLYTE BALANCE      7 - 17 mmol/L (calc) 9    CALCIUM      8.6 - 10.2 mg/dL 9.2    PROTEIN, TOTAL      6.1 - 8.1 g/dL 7.6    ALBUMIN      3.6 - 5.1 g/dL 4.3    BILIRUBIN, TOTAL      0.2 - 1.2 mg/dL 0.4    ALKALINE PHOSPHATASE      31 - 125 U/L 67    AST      10 - 30 U/L 21    ALT      6 - 29 U/L 27    CHOLESTEROL, TOTAL      <200 mg/dL 150    HDL CHOLESTEROL      > OR = 50 mg/dL 58    TRIGLYCERIDES      <150 mg/dL 183 (H)    LDL-CHOLESTEROL      mg/dL (calc) 66    CHOL/HDLC RATIO      <5.0 (calc) 2.6    NON HDL CHOLESTEROL      <130 mg/dL (calc) 92    VITAMIN D,25-OH,TOTAL,IA      30 - 100  ng/mL 27 (L)    TSH      mIU/L 1.56       Legend:  (H) High  (L) Low  Physical Exam  Constitutional:       Appearance: Normal appearance. She is normal weight.   HENT:      Head: Normocephalic.   Eyes:      Extraocular Movements: Extraocular movements intact.      Conjunctiva/sclera: Conjunctivae normal.      Pupils: Pupils are equal, round, and reactive to light.   Cardiovascular:      Rate and Rhythm: Normal rate and regular rhythm.      Pulses: Normal pulses.      Heart sounds: Normal heart sounds.   Pulmonary:      Effort: Pulmonary effort is normal.      Breath sounds: Normal breath sounds.   Musculoskeletal:         General: Normal range of motion.      Cervical back: Normal range of motion.   Skin:     General: Skin is warm and dry.   Neurological:      General: No focal deficit present.      Mental Status: She is alert and oriented to person, place, and time.   Psychiatric:         Mood and Affect: Mood normal.         Behavior: Behavior normal.         Thought Content: Thought content normal.         Judgment: Judgment normal.            Assessment/Plan

## 2025-09-10 ENCOUNTER — APPOINTMENT (OUTPATIENT)
Dept: PRIMARY CARE | Facility: CLINIC | Age: 25
End: 2025-09-10
Payer: COMMERCIAL